# Patient Record
Sex: MALE | Race: WHITE
[De-identification: names, ages, dates, MRNs, and addresses within clinical notes are randomized per-mention and may not be internally consistent; named-entity substitution may affect disease eponyms.]

---

## 2020-04-09 ENCOUNTER — HOSPITAL ENCOUNTER (EMERGENCY)
Age: 60
Discharge: HOME | End: 2020-04-09
Payer: COMMERCIAL

## 2020-04-09 PROCEDURE — 72125 CT NECK SPINE W/O DYE: CPT

## 2020-04-09 PROCEDURE — 70450 CT HEAD/BRAIN W/O DYE: CPT

## 2020-04-09 PROCEDURE — 72128 CT CHEST SPINE W/O DYE: CPT

## 2020-04-09 PROCEDURE — 72131 CT LUMBAR SPINE W/O DYE: CPT

## 2020-04-09 PROCEDURE — 99283 EMERGENCY DEPT VISIT LOW MDM: CPT

## 2023-10-06 LAB
BUN BLD-MCNC: 18 MG/DL (ref 7–18)
GLUCOSE SERPLBLD-MCNC: 134 MG/DL (ref 74–106)
HCT VFR BLD CALC: 34.7 % (ref 39.6–49)
LYMPHOCYTES # SPEC AUTO: 1.1 K/UL (ref 0.7–4.9)
MCV RBC: 87.3 FL (ref 80–100)
PMV BLD: 8.8 FL (ref 7.6–11.3)
POTASSIUM SERPL-SCNC: 2.9 MEQ/L (ref 3.5–5.1)
RBC # BLD: 3.97 M/UL (ref 4.33–5.43)
WBC # BLD AUTO: 6.7 THOU/UL (ref 4.3–10.9)

## 2023-10-06 NOTE — EKG
Test Date:    2023-10-06               Test Time:    11:18:36

Technician:   YUMIKO                                     

                                                     

MEASUREMENT RESULTS:                                       

Intervals:                                           

Rate:         64                                     

DC:           162                                    

QRSD:         94                                     

QT:           448                                    

QTc:          462                                    

Axis:                                                

P:            63                                     

DC:           162                                    

QRS:          -36                                    

T:            25                                     

                                                     

INTERPRETIVE STATEMENTS:                                       

                                                     

Normal sinus rhythm

Left axis deviation

Abnormal ECG

Compared to ECG 09/22/2009 14:32:09

Left-axis deviation now present



Electronically Signed On 10-06-23 12:19:30 CDT by Joseph Mccollum

## 2023-10-06 NOTE — RAD REPORT
EXAM DESCRIPTION:  RAD - Chest Pa And Lat (2 Views) - 10/6/2023 11:34 am

 

CLINICAL HISTORY:  PREPROCEDURE EXAM. Hypertension

 

COMPARISON:  Chest Pa And Lat (2 Views) dated 3/30/2022; CHEST PA AND LAT 2 VIEW dated 9/22/2009

 

TECHNIQUE:  PA and lateral views of the chest were obtained.

 

FINDINGS:  The lungs are clear. Mild hyperinflation, stable. Heart size is normal and central vascula
ture is within normal limits. No pleural effusion or pneumothorax seen. No acute bony finding noted.

 

IMPRESSION:  No acute cardiopulmonary process.

## 2023-10-09 ENCOUNTER — HOSPITAL ENCOUNTER (OUTPATIENT)
Dept: HOSPITAL 97 - OR | Age: 63
Discharge: HOME | End: 2023-10-09
Attending: SURGERY
Payer: COMMERCIAL

## 2023-10-09 VITALS — OXYGEN SATURATION: 93 %

## 2023-10-09 VITALS — TEMPERATURE: 97.6 F | DIASTOLIC BLOOD PRESSURE: 78 MMHG | SYSTOLIC BLOOD PRESSURE: 149 MMHG

## 2023-10-09 DIAGNOSIS — A63.0: Primary | ICD-10-CM

## 2023-10-09 PROCEDURE — 82947 ASSAY GLUCOSE BLOOD QUANT: CPT

## 2023-10-09 PROCEDURE — 36415 COLL VENOUS BLD VENIPUNCTURE: CPT

## 2023-10-09 PROCEDURE — 0HBAXZZ EXCISION OF INGUINAL SKIN, EXTERNAL APPROACH: ICD-10-PCS

## 2023-10-09 PROCEDURE — 93005 ELECTROCARDIOGRAM TRACING: CPT

## 2023-10-09 PROCEDURE — 85025 COMPLETE CBC W/AUTO DIFF WBC: CPT

## 2023-10-09 PROCEDURE — 11426 EXC H-F-NK-SP B9+MARG >4 CM: CPT

## 2023-10-09 PROCEDURE — 88305 TISSUE EXAM BY PATHOLOGIST: CPT

## 2023-10-09 PROCEDURE — 80048 BASIC METABOLIC PNL TOTAL CA: CPT

## 2023-10-09 PROCEDURE — 71046 X-RAY EXAM CHEST 2 VIEWS: CPT

## 2023-10-09 RX ADMIN — SODIUM CHLORIDE ONE GM: 9 INJECTION, SOLUTION INTRAVENOUS at 08:25

## 2023-10-09 RX ADMIN — SODIUM CHLORIDE ONE GM: 9 INJECTION, SOLUTION INTRAVENOUS at 08:08

## 2023-10-09 NOTE — P.BOP
Preoperative diagnosis: Large perineal ulcerated masses [id number]


Postoperative diagnosis: same


Primary procedure: Wide excision large ulcerated skin masses 


Estimated blood loss: <10cc


Specimen: masses


Anesthesia: General


Complications: None


Drain(s): Other (silvadene with gauze)


Transferred to: Recovery Room


Condition: Good

## 2023-10-11 NOTE — DS
Date of Discharge:  10/09/2023



Diagnosis:  Large perineal ulcerated masses.



Procedure:  Wide excision of large ulcerated skin masses.



Disposition:  Home.



Plan:  [facility] and dressing david ESCOBAR

DD:  10/11/2023 10:12:30Voice ID:  [id number]

DT:  10/11/2023 19:31:07Report ID:  [id number]

## 2023-10-11 NOTE — OP
Surgeon:  Silvino Rivero MD



Preoperative Diagnosis:  Large perineal ulcerated mass altogether is about 25 x 20 cm.



Postoperative Diagnosis:  Large perineal ulcerated mass altogether is about 25 x 20 cm.



Procedure:  Wide excision of large ulcerated skin mass about 25 x 20 cm total fungating mass.



Estimated Blood Loss:  Less than 10 mL.



Specimen:  Fungating mass.



Anesthesia:  General plus local.



Findings:  The patient had this fungating lesion, ulcerated, large, multiple areas including perineum
, both thighs, even part of the scrotum.  When you put all this together and satellite lesions ______
____ area is about 25 x 20 cm.



Complications:  None.



Packing:  Gauze with Silvadene.  The area was left to close by secondary intent.



Indication:  This is a case of a 62-year-old patient with a large mass on the perineal area, involved
 part of the scrotal skin and the patient wants that excised.  This is draining, __________ discomfor
t, may be a large gigantic condyloma area.  He understands the risks of excision, which include, but 
not limited to, infection, bleeding, damage to adjacent structures, anesthesia complications, MI, and
 even death.  Proper precaution was done in the OR in case this is a condyloma.  The patient was prep
ped.



Description Of Procedure:  The patient was brought to the operating room, placed in supine position. 
 Anesthesia was done without complication.  The patient was placed in lithotomy position with proper 
protection.  Proper precautions were also done by the OR in case once again if it is a virus.  Wide e
xcision of this area was done.  There are large bases, different locations, multiple satellites.  We 
were trying to remove the bulkiness of this.  There were some small areas he might have to do in anot
her time due to the large amount of the wounds __________ open.  All of them were excised all the way
 down to the subcutaneous tissue.  The area was irrigated.  These are too large to be closed, so we c
overed the area with Silvadene and gauze in that region.  The patient tolerated the procedure well.  
The patient was sent to Recovery in stable condition.





DAYDAY/ALIDA

DD:  10/11/2023 10:12:30Voice ID:  [id number]

DT:  10/11/2023 19:27:43Report ID:  [id number]

## 2023-10-16 ENCOUNTER — HOSPITAL ENCOUNTER (EMERGENCY)
Dept: HOSPITAL 97 - ER | Age: 63
Discharge: HOME | End: 2023-10-16
Payer: COMMERCIAL

## 2023-10-16 VITALS — SYSTOLIC BLOOD PRESSURE: 105 MMHG | DIASTOLIC BLOOD PRESSURE: 74 MMHG

## 2023-10-16 VITALS — OXYGEN SATURATION: 100 % | TEMPERATURE: 98.1 F

## 2023-10-16 DIAGNOSIS — T81.31XA: Primary | ICD-10-CM

## 2023-10-16 DIAGNOSIS — S71.101A: ICD-10-CM

## 2023-10-16 DIAGNOSIS — S71.102A: ICD-10-CM

## 2023-10-16 DIAGNOSIS — F17.210: ICD-10-CM

## 2023-10-16 PROCEDURE — 99283 EMERGENCY DEPT VISIT LOW MDM: CPT

## 2023-10-16 NOTE — EDPHYS
Physician Documentation                                                                           

 [facility]                                                                 

Name: Yuliana Moore                                                                                

Age: 62 yrs                                                                                       

Sex: Male                                                                                         

: 1960                                                                                   

MRN: [id number]                                                                                   

Arrival Date: 10/16/2023                                                                          

Time: 00:06                                                                                       

Account#: [id number]                                                                            

Bed 14                                                                                            

Private MD: Pankaj Lloyd E                                                                     

ED Physician Yonatan Isaac                                                                       

HPI:                                                                                              

10/16                                                                                             

01:54 This 62 yrs old Male presents to ER via Ambulatory with complaints of Skin tag Bleeding.snw 

01:54 Patient presents to ED for recheck of: bleeding surgical wound. The affected area is on snw 

      the posterior perineum. Progress: The patient reports decreased bleeding. The patient       

      has not experienced similar symptoms in the past. Surgical removal of large condyloma       

      by Dr. Rivero on Monday (6 days ago).                                                     

                                                                                                  

Historical:                                                                                       

- Allergies:                                                                                      

00:42 No Known Allergies;                                                                     as6 

- PMHx:                                                                                           

00:42 CAD; COPD; Diabetes - IDDM; High Cholesterol; Hypertension; Arthritis;                  as6 

- PSHx:                                                                                           

00:42 eye; Stented artery; knee; skin tag;                                                    as6 

                                                                                                  

- Immunization history:: Adult Immunizations up to date.                                          

- Social history:: Smoking status: Patient reports the use of cigarette tobacco                   

  products, smokes two packs cigarettes per day.                                                  

                                                                                                  

                                                                                                  

ROS:                                                                                              

01:42 Constitutional: Negative for fever, chills, and weight loss, Eyes: Negative for injury, snw 

      pain, redness, and discharge, ENT: Negative for injury, pain, and discharge, Neck:          

      Negative for injury, pain, and swelling, Cardiovascular: Negative for chest pain,           

      palpitations, and edema, Respiratory: Negative for shortness of breath, cough,              

      wheezing, and pleuritic chest pain, Abdomen/GI: Negative for abdominal pain, nausea,        

      vomiting, diarrhea, and constipation, Back: Negative for injury and pain, MS/Extremity:     

      Negative for injury and deformity, Neuro: Negative for headache, weakness, numbness,        

      tingling, and seizure, Psych: Negative for depression, anxiety, suicide ideation,           

      homicidal ideation, and hallucinations,                                                     

01:42 Skin: Positive for wound bleed, blood clot,                                                 

                                                                                                  

Exam:                                                                                             

01:42 Constitutional:  This is a well developed, well nourished patient who is awake, alert,  snw 

      and in no acute distress. Head/Face:  Normocephalic, atraumatic. Eyes:  Pupils equal        

      round and reactive to light, extra-ocular motions intact.  Lids and lashes normal.          

      Conjunctiva and sclera are non-icteric and not injected.  Cornea within normal limits.      

      Periorbital areas with no swelling, redness, or edema. ENT:  Nares patent. No nasal         

      discharge, no septal abnormalities noted.  Tympanic membranes are normal and external       

      auditory canals are clear.  Oropharynx with no redness, swelling, or masses, exudates,      

      or evidence of obstruction, uvula midline.  Mucous membranes moist. Neck:  Trachea          

      midline, no thyromegaly or masses palpated, and no cervical lymphadenopathy.  Supple,       

      full range of motion without nuchal rigidity, or vertebral point tenderness.  No            

      Meningismus. Chest/axilla:  Normal chest wall appearance and motion.  Nontender with no     

      deformity.  No lesions are appreciated. Cardiovascular:  Regular rate and rhythm with a     

      normal S1 and S2.  No gallops, murmurs, or rubs.  Normal PMI, no JVD.  No pulse             

      deficits. Respiratory:  Lungs have equal breath sounds bilaterally, clear to                

      auscultation and percussion.  No rales, rhonchi or wheezes noted.  No increased work of     

      breathing, no retractions or nasal flaring. Abdomen/GI:  Soft, non-tender, with normal      

      bowel sounds.  No distension or tympany.  No guarding or rebound.  No evidence of           

      tenderness throughout. Back:  No spinal tenderness.  No costovertebral tenderness.          

      Full range of motion. MS/ Extremity:  Pulses equal, no cyanosis.  Neurovascular intact.     

       Full, normal range of motion. Neuro:  Awake and alert, GCS 15, oriented to person,         

      place, time, and situation.  Cranial nerves II-XII grossly intact.  Motor strength 5/5      

      in all extremities.  Sensory grossly intact.  Cerebellar exam normal.  Normal gait.         

      Psych:  Awake, alert, with orientation to person, place and time.  Behavior, mood, and      

      affect are within normal limits.                                                            

01:42 Skin: Appearance: scaly, thickened, dry skin diffusely, area to posterior perineum with     

      post surgical wound that was scooped out diffusely. The right posterior thigh with          

      large hematoma over wound with bleeding around wound. scooped proximal wound that was       

      partially black and spongy is without bleeding. Left thigh with scooped out area with       

      moist rotted cauliflower appearance, no bleeding,                                           

                                                                                                  

Vital Signs:                                                                                      

00:40  / 78; Pulse 68; Resp 18 S; Temp 98.1(TE); Pulse Ox 100% on R/A; Weight 126.1 kg  as6 

      (R); Height 6 ft. 0 in. (R); Pain 3/10;                                                     

01:35  / 74; Pulse 64; Resp 18 S; Pulse Ox 100% on R/A;                                 as6 

00:40 Body Mass Index 37.70 (126.10 kg, 182.88 cm)                                            as6 

00:40 Pain Scale: Adult                                                                       as6 

                                                                                                  

MDM:                                                                                              

00:18 Patient medically screened.                                                             snw 

01:36 Differential diagnosis:.                                                                snw 

01:36 Differential diagnosis: bleeding, infection. Data reviewed: vital signs, nurses notes.  snw 

      Management of patient was discussed with the following: Dr. Rivero, large condyloma       

      removed and wound bed had been bovied to eschar. Pack and send to office in the am.         

      Counseling: I had a detailed discussion with the patient and/or guardian regarding the      

      historical points, exam findings, and any diagnostic results supporting the                 

      discharge/admit diagnosis, the need for outpatient follow up, a general surgeon.            

      Response to treatment: the patient's symptoms have markedly improved after treatment.       

      Special discussion: Based on the history and exam findings, there is no indication for      

      further emergent testing or inpatient evaluation. I discussed with the patient/guardian     

      the need to see the general surgeon for further evaluation of the symptoms. ED course:      

      wound cleansed, blood clots removed, hemostasis maintained, surgicel placed, moist 4x4      

      packed, covered with abd pad and brief placed..                                             

                                                                                                  

10/16                                                                                             

01:23 Order name: Misc. Order: surgicel x3; Complete Time:                               snw 

10/16                                                                                             

01:23 Order name: Dressing - Wound; Complete Time:                                       snw 

10/16                                                                                             

01:23 Order name: Gloves, Sterile; Complete Time:                                        snw 

10/16                                                                                             

01:23 Order name: Setup Suture Tray; Complete Time:                                      snw 

                                                                                                  

Administered Medications:                                                                         

:33 Drug: Hibiclens Topical Liquid 4 % 1 application Topical once Route: Topical; Site:     as6 

      affected area;                                                                              

:33 Follow up: Response: No adverse reaction                                                as6 

                                                                                                  

                                                                                                  

Disposition:                                                                                      

01:44 Co-signature as Attending Physician, Yonatan Isaac MD I agree with the assessment and   kdr 

      plan of care.                                                                               

                                                                                                  

Disposition Summary:                                                                              

10/16/23 01:28                                                                                    

Discharge Ordered                                                                                 

 Notes:       Location: Home                                                                        
  snw

      Condition: Stable                                                                       snw 

      Diagnosis                                                                                   

        - Open wound of thigh                                                                 snw 

        - Disruption of wound, not elsewhere classified - blood clot removal and wound        snw 

      hemostasis                                                                                  

      Followup:                                                                               snw 

        - With: Silvino Rivero MD                                                                

        - When: Tomorrow                                                                           

        - Reason: Recheck today's complaints, Continuance of care, Re-evaluation by your           

      physician                                                                                   

      Discharge Instructions:                                                                     

        - Discharge Summary Sheet                                                             snw 

        - Wound Care, Adult                                                                   snw 

        - Surgical Wound Debridement                                                          snw 

        - Wound Packing                                                                       snw 

      Forms:                                                                                      

        - Medication Reconciliation Form                                                      snw 

        - Thank You Letter                                                                    snw 

        - Antibiotic Education                                                                snw 

        - Prescription Opioid Use                                                             snw 

        - Patient Portal Instructions                                                         snw 

        - Leadership Thank You Letter                                                         snw 

Signatures:                                                                                       

Yonatan Isaac MD MD kdr Waters, Shelly, LISAP-C                   FNP-Csnw                                                  

Niels Mcgee, RN                      RN   as6                                                  

                                                                                                  

**************************************************************************************************

## 2023-10-16 NOTE — ER
Nurse's Notes                                                                                     

 [facility]                                                                 

Name: Yuliana Moore                                                                                

Age: 62 yrs                                                                                       

Sex: Male                                                                                         

: 1960                                                                                   

MRN: [id number]                                                                                   

Arrival Date: 10/16/2023                                                                          

Time: 00:06                                                                                       

Account#: [id number]                                                                            

Bed 14                                                                                            

Private MD: Pankaj Lloyd E                                                                     

Diagnosis: Open wound of thigh;Disruption of wound, not elsewhere classified-blood clot removal   

  and wound hemostasis                                                                            

                                                                                                  

Presentation:                                                                                     

10/16                                                                                             

00:40 Chief complaint: Patient states: "I had some skin tags removed on Monday by Dr. haley Rivero and yesterday since about  they haven't stopped bleeding". Coronavirus         

      screen: At this time, the client does not indicate any symptoms associated with             

      coronavirus-19. Ebola Screen: No symptoms or risks identified at this time. Initial         

      Sepsis Screen: Does the patient meet any 2 criteria? No. Patient's initial sepsis           

      screen is negative. Does the patient have a suspected source of infection? No.              

      Patient's initial sepsis screen is negative. Risk Assessment: Do you want to hurt           

      yourself or someone else? Patient reports no desire to harm self or others. Onset of        

      symptoms was October 15, 2023 at 19:30.                                                     

00:40 Method Of Arrival: Ambulatory                                                           as6 

00:40 Acuity: TATIANA 3                                                                           as6 

                                                                                                  

Historical:                                                                                       

- Allergies:                                                                                      

00:42 No Known Allergies;                                                                     as6 

- PMHx:                                                                                           

00:42 CAD; COPD; Diabetes - IDDM; High Cholesterol; Hypertension; Arthritis;                  as6 

- PSHx:                                                                                           

00:42 eye; Stented artery; knee; skin tag;                                                    as6 

                                                                                                  

- Immunization history:: Adult Immunizations up to date.                                          

- Social history:: Smoking status: Patient reports the use of cigarette tobacco                   

  products, smokes two packs cigarettes per day.                                                  

                                                                                                  

                                                                                                  

Screenin:43 [facility] ED Fall Risk Assessment (Adult) Score/Fall Risk Level 0 - 2 = Low Risk. Abuse  as6 

      screen: Denies threats or abuse. Denies injuries from another. Nutritional screening:       

      No deficits noted. Tuberculosis screening: No symptoms or risk factors identified.          

                                                                                                  

Assessment:                                                                                       

01:34 General: Appears in no apparent distress. Behavior is calm, cooperative. Pain:          as6 

      Complains of pain in buttocks. Neuro: Level of Consciousness is awake, alert, obeys         

      commands, Oriented to person, place, time, situation. Cardiovascular: Capillary refill      

      < 3 seconds Patient's skin is warm and dry. Respiratory: Respiratory effort is even,        

      unlabored, Respiratory pattern is regular, symmetrical. GI: No deficits noted. No signs     

      and/or symptoms were reported involving the gastrointestinal system. : No deficits        

      noted. No signs and/or symptoms were reported regarding the genitourinary system. EENT:     

      No deficits noted. No signs and/or symptoms were reported regarding the EENT system.        

      Derm: Wound noted perineum Wound is concave, bleeding, slough noted. Musculoskeletal:       

      Circulation, motion, and sensation intact.                                                  

                                                                                                  

Vital Signs:                                                                                      

00:40  / 78; Pulse 68; Resp 18 S; Temp 98.1(TE); Pulse Ox 100% on R/A; Weight 126.1 kg  as6 

      (R); Height 6 ft. 0 in. (R); Pain 3/10;                                                     

01:35  / 74; Pulse 64; Resp 18 S; Pulse Ox 100% on R/A;                                 as6 

00:40 Body Mass Index 37.70 (126.10 kg, 182.88 cm)                                            as6 

00:40 Pain Scale: Adult                                                                       as6 

                                                                                                  

ED Course:                                                                                        

00:08 Patient arrived in ED.                                                                  mr  

00:09 Pankaj Lloyd MD is Private Physician.                                                mr  

00:17 [facility]                                                          snw 

00:17 Yonatan Isaac MD is Attending Physician.                                              snw 

00:18 Niels Mcgee, LILIA is Primary Nurse.                                                    as6 

00:26 Arm band placed on.                                                                     as6 

00:42 Triage completed.                                                                       as6 

00:43 Placed in gown. Bed in low position. Call light in reach. Side rails up X 1.            as6 

01:23 Silvino Rivero MD is Referral Physician.                                              snw 

01:33 Provided Education on: follow up, wound care.                                           as6 

01:33 No provider procedures requiring assistance completed. Patient did not have IV access   as6 

      during this emergency room visit.                                                           

                                                                                                  

Administered Medications:                                                                         

01:33 Drug: Hibiclens Topical Liquid 4 % 1 application Topical once Route: Topical; Site:     as6 

      affected area;                                                                              

:33 Follow up: Response: No adverse reaction                                                as6 

                                                                                                  

                                                                                                  

Medication:                                                                                       

01:33 VIS not applicable for this client.                                                     as6 

                                                                                                  

Outcome:                                                                                          

:28 Discharge ordered by MD.                                                                snw 

01:33 Discharged to home ambulatory,                                                          as6 

01:33 Condition: stable                                                                           

01:33 Discharge instructions given to patient, Instructed on discharge instructions, follow       

      up and referral plans. wound care, Demonstrated understanding of instructions,              

      follow-up care, wound care,                                                                 

01:36 Patient left the ED.                                                                    as6 

                                                                                                  

Signatures:                                                                                       

Consuelo Hernandez FNP-C FNP-Nereyda Espinoza, Reg                       Reg  Niels Chavez, RN                      RN   as6                                                  

                                                                                                  

**************************************************************************************************

## 2024-09-11 ENCOUNTER — HOSPITAL ENCOUNTER (EMERGENCY)
Dept: HOSPITAL 97 - ER | Age: 64
Discharge: HOME | End: 2024-09-11
Payer: COMMERCIAL

## 2024-09-11 VITALS — DIASTOLIC BLOOD PRESSURE: 77 MMHG | OXYGEN SATURATION: 95 % | SYSTOLIC BLOOD PRESSURE: 113 MMHG

## 2024-09-11 VITALS — TEMPERATURE: 98 F

## 2024-09-11 DIAGNOSIS — F17.210: ICD-10-CM

## 2024-09-11 DIAGNOSIS — L76.22: Primary | ICD-10-CM

## 2024-09-11 PROCEDURE — 99281 EMR DPT VST MAYX REQ PHY/QHP: CPT

## 2024-09-11 NOTE — XMS REPORT
Continuity of Care Document



                         Created on: 2024





YULIANA MOORE NIMA

External Reference #: [id number]

: 1960

Sex: Male



Demographics





                                        Address              [address] 

[place]  [address]

 

                                        Home Phone          [phone]

 

                                        Work Phone          [phone]

 

                                        Mobile Phone        [phone]

 

                                        Email Address       [email or web address]

 

                                        Preferred Language  English

 

                                        Marital Status      Unknown

 

                                        Mosque Affiliation Unknown

 

                                        Race                Unknown

 

                                        Additional Race(s)  Unavailable

White

 

                                        Ethnic Group        Unknown





Author





                                        Name                Unknown

 

                                        Address             [address]

[place]  [address]

 

                                        [facility]

[employer]connect

 

                                        Address             [address]

[place]  [address]

 

                                        Phone               [phone]





Support





                          Name         Relationship Address      Phone

 

                                ALEAH MOORE     FA               [address]

[place]  [address]                     [phone]

 

                                ALEAH MOORE     Personal Relationship [address]

[place]  [address]                     [phone]

 

                                HANNAH GAGNON    [place]

[place]  [address]                             [phone]

 

                                HANNAH SMITH  SI              [address]

[place]  [address]                     [phone]

 

                                Oscar Jack     Father          [address] [place] [address]                     [phone]





Care Team Providers





                                Care Team Member Name Role            Phone

 

                                Pankaj Lloyd Primary Care Physician [phone]



 

                                Pankaj Lloyd  Attending Clinician Unavailable

 

                                Manuel Tamayo  Attending Clinician Unavailable

 

                                , [facility] Sleep Lab Bed Attending Clinician Unavail

able

 

                                Sly Oconnor MD Attending Clinician [phone]

[phone]

 

                                SLY OCONNOR Attending Clinician UnavailSLY Orlando Attending Clinician Unavaila

azul

 

                                Doctor Unassigned, No Name Attending Clinician U

navailable

 

                                Vasyl Lloyd  Attending Clinician Unavailable

 

                                Edmund Jeffrey Attending Clinician Unavailabl

e

 

                                MANUEL TAMAYO  Attending Clinician Unavailable

 

                                MARK YEE  Attending Clinician Unavailable

 

                                Will RODRIGUES, Zachary HEART Attending Clinician Unavail

able

 

                                ADDY PEREZ       Attending Clinician Unavailable

 

                                Solange Chavez Attending Clinician [phone]



 

                                Addy Perez MD    Attending Clinician [phone]

[phone]

 

                                Radiology       Attending Clinician Unavailable

 

                                Manuel Tamayo  Admitting Clinician Unavailable

 

                                Edmund Jeffrey Admitting Clinician UnavailMANUEL Alvarado  Admitting Clinician Unavailable

 

                                Anuradha Dash Admitting Clinician Unavailable

 

                                MARK EYE  Admitting Clinician Unavailable

 

                                ADDY PEREZ       Admitting Clinician Unavailable

 

                                Addy Perez MD    Admitting Clinician [phone]

[phone]







Payers





                    Payer Name Policy Type Policy Number Effective Date Expirati

on Date Source

 

                                                    HIM MITESH FROM 

[facility]                         [id number]         2020 

00:00:00                                            







Problems





                                                    Condition 

Name                                    Condition 

Details                                 Condition 

Category                  Status                    Onset 

Date                                    Resolution 

Date                                    Last 

Treatment 

Date                                    Treating 

Clinician                 Comments                  Source

 

                                                    Coronary 

artery 

disease 

involving 

native 

coronary 

artery of 

native 

heart with 

angina 

pectoris                                Coronary 

artery 

disease 

involving 

native 

coronary 

artery of 

native 

heart with 

angina 

pectoris            Disease             Active               

00:00:

00                                                               [facility]

 

                                                    Acute on 

chronic 

diastolic 

congestive 

heart 

failure                                 Acute on 

chronic 

diastolic 

congestive 

heart 

failure             Disease             Active               

00:00:

00                                                               [facility]

 

                                                    Essential 

hypertensi

on                                      Essential 

hypertensi

on                  Disease             Active               

00:00:

00                                                               [facility]

 

                                                    Other 

hyperlipid

emia                                    Other 

hyperlipid

emia                Disease             Active               

00:00:

00                                                               [facility]

 

                                                    Type 2 

diabetes 

mellitus 

without 

complicati

on, 

without 

long-term 

current 

use of 

insulin                                 Type 2 

diabetes 

mellitus 

without 

complicati

on, 

without 

long-term 

current 

use of 

insulin             Disease             Active               

00:00:

00                                                               [facility]

 

                                                    BROOKE 

(obstructi

ve sleep 

apnea)                                  BROOKE 

(obstructi

ve sleep 

apnea)              Disease             Active              

6 

00:00:

00                                                               [facility]

 

                                                    Acute 

kidney 

injury 

(LAURA) with 

acute 

tubular 

necrosis 

(ATN)                                   Acute 

kidney 

injury 

(LAURA) with 

acute 

tubular 

necrosis 

(ATN)               Disease             Active              

3- 

00:00:

00                                                               [facility]

 

                                                    Hyperglyce

cortney                                     Hyperglyce

cortney                 Disease             Active              

2-29 

00:00:

00                                                               [facility]

 

                      Chest pain Chest pain Disease    Active     

9-10 

00:00:

00                                                               [facility]

 

                                                    Other 

chest pain                              Other 

chest pain          Disease             Active              2017

0-08 

00:00:

00                                                               [facility]

 

                                                    Unstable 

angina                                  Unstable 

angina              Disease             Active              2017

0-07 

00:00:

00                                                               [facility]

 

                                                    Obesity 

(BMI 

30-39.9)                                Obesity 

(BMI 

30-39.9)            Disease             Active              2017

0 

00:00:

00                                                               [facility]

 

                                                    Knee pain, 

left                                    Knee pain, 

left                Disease             Active              2017-0

5-10 

00:00:

00                                                               [facility]

 

                                                    Knee pain, 

left                                    Knee pain, 

left                Disease             Active              2017-0

5-10 

00:00:

00                                                               [facility]







Allergies, Adverse Reactions, Alerts





                                                    Allergy 

Name                                    Allergy 

Type            Status          Severity        Reaction(s)     Onset 

Date                                    Inactive 

Date                                    Treating 

Clinician                 Comments                  Source

 

                                                    No Known 

Allergie

s            DA           Active       U                          

00:00:

00                                                              [facility]

 

                                                    No Known 

Allergie

s            DA           Active       U                         2017

0 

00:00:

00                                                              [facility]

 

                                                    NO KNOWN 

ALLERGIE

S                                       Drug 

Class   Active                                                  [facility]







Social History





                    Social Habit Start Date Stop Date Quantity  Comments  Source

 

                                                    History of tobacco 

use                                    Cigarette Smoker              [facility]

 

                                                    Exposure to 

SARS-CoV-2 (event)                      2023 

00:00:00                                2023 

02:23:00            Not sure                                [facility]

 

                                        Alcohol intake      2020 

00:00:00                                2020 

00:00:00                                Current 

non-drinker of 

alcohol 

(finding)                                           [facility]

 

                                                    Tobacco use and 

exposure                                2017-10-07 

00:00:00                                2017-10-07 

00:00:00                                Smokeless 

tobacco non-user                                    [facility]

 

                                                    Cigarettes smoked 

current (pack per 

day) - Reported                         2017-10-07 

00:00:00                                2017-10-07 

00:00:00                                                    [facility]

 

                                                    Cigarette 

pack-years                              2017-10-07 

00:00:00                                2017-10-07 

00:00:00                                                    [facility]

 

                                        Alcohol Comment     2009-12-15 

00:00:00                                2009-12-15 

00:00:00            quit drinking                           [facility]

 

                                                    Sex Assigned At 

Birth                                   1960 

00:00:00                                1960 

00:00:00                                                    [facility]







                          Smoking Status Start Date   Stop Date    Source

 

                          Smokes tobacco daily 2017-10-07 00:00:00              

[facility]







Medications





                                                    Ordered 

Medication 

Name                                    Filled 

Medication 

Name                                    Start 

Date                                    Stop 

Date                                    Current 

Medication?                             Ordering 

Clinician       Indication      Dosage          Frequency       Signature 

(SIG)               Comments            Components          Source

 

                                                    aspirin 81 

mg chewable 

tablet                                               

00:00:

00                  Yes                 [id number]  81mg                Take 1 

tablet by 

mouth 

daily.                                                      [facility]

 

                                                    tamsulosin 

0.4 mg 24 

hr capsule                                           

14:28:

29                  Yes                           .4mg                Take 0.4 

mg by 

mouth 

daily.                                                      [facility]

 

                                                    metFORMIN 

500 mg 

tablet                                               

14:28:

29                  Yes                           500mg               Take 500 

mg by 

mouth 2 

(two) 

times 

daily with 

meals.                                                      [facility]

 

                                                    insulin 

aspart 

prot/insuln 

asp 

(NOVOLOG 

MIX 

70-30FLEXPE

N U-100 SC)                                          

14:28:

29                  Yes                                               inject 

under the 

skin 2 

(two) 

times 

daily with 

meals.                                                      [facility]

 

                                                    benazepril-

hydrochlort

hiazide 

20-25 mg 

per tablet                                           

00:00:

00                  Yes                 [id number]  1{tbl}              Take 1 

tablet by 

mouth 

daily.                                                      [facility]

 

                                                    carvediloL 

12.5 mg 

tablet                                               

00:00:

00                  Yes                 [id number]  12.5mg              Take 1 

tablet by 

mouth 2 

(two) 

times 

daily.                                                      [facility]

 

                                                    isosorbide 

mononitrate 

60 mg 24 hr 

tablet                                               

00:00:

00                  Yes                 [id number]  60mg                Take 1 

tablet by 

mouth 

daily.                                                      [facility]

 

                                                    tamsulosin 

0.4 mg 24 

hr capsule                                          2020-0

3-02 

18:15:

44                  Yes                           .4mg                Take 0.4 

mg by 

mouth 

daily.                                                      [facility]

 

                                                    metFORMIN 

(GLUCOPHAGE

) tablet 

500 mg                                              2020-0

3-02 

14:00:

00                  Yes                           500mg               500 mg, 

Oral, BID 

MEALS, 

First dose 

on Mon 

3/2/20 at 

0800, 

Until 

Discontinu

ed, 

Routine                                                     [facility]

 

                                                    glyBURIDE 5 

mg tablet                                           2020-0

3-02 

00:00:

00                                       

04:59

:00        No                    [id number]   5mg                   Take 1 

tablet by 

mouth 2 

(two) 

times 

daily with 

meals for 

30 days.                                                    [facility]

 

                                                    metFORMIN 

500 mg 

tablet                                              2020-0

3-02 

00:00:

 

04:59

:00        No                    [id number]   500mg                 Take 1 

tablet by 

mouth 2 

(two) 

times 

daily with 

meals for 

30 days.                                                    [facility]

 

                                                    NaCl 0.9% 

(NS) IV 

infusion 

1,000 mL                                            2020-0

3-01 

23:45:

00                  Yes                           1000mL              at 75 

mL/hr, IV 

Infusion, 

CONTINUOUS

, Starting 

Sun 3/1/20 

at 1745, 

Until 

Discontinu

ed, 

Routine                                                     [facility]

 

                                                    clopidogreL 

(PLAVIX) 

tablet 75 

mg                                                  2020-0

3-01 

15:00:

00                  Yes                           75mg                75 mg, 

Oral, 

DAILY, 

First dose 

on Sun 

3/1/20 at 

0900, 

Until 

Discontinu

ed, 

Routine                                                     [facility]

 

                                                    glyBURIDE 

(DIABETA) 

tablet 5 mg                                         2020-0

3-01 

14:30:

00                  Yes                           5mg                 5 mg, 

Oral, BID 

MEALS, 

First dose 

on Sun 

3/1/20 at 

0830, 

Until 

Discontinu

ed, 

Routine                                                     [facility]

 

                                                    gabapentin 

(NEURONTIN) 

tablet 600 

mg                                                  2020-0

3-01 

14:00:

00                  Yes                           600mg               600 mg, 

Oral, TID, 

First dose 

on Sun 

3/1/20 at 

0800, 

Until 

Discontinu

ed, 

Routine                                                     [facility]

 

                                                    carvediloL 

(COREG) 

tablet 6.25 

mg                                                  2020-0

3-01 

14:00:

00                  Yes                           6.25mg              6.25 mg, 

Oral, BID, 

First dose 

on Sun 

3/1/20 at 

0800, 

Until 

Discontinu

ed, 

Routine                                                     [facility]

 

                                                    cetirizine 

(ZYRTEC) 

tablet 5 mg                                         2020-0

3-01 

06:15:

00                                       

05:20

:00        No                               5mg                   5 mg, 

Oral, 

ONCE, 1 

dose, Sun 

3/1/20 at 

0015, 

Routine                                                     [facility]

 

                                                    fluticasone 

propion-sal

meterol 

(ADVAIR) 

250-50 

mcg/dose 

inhalation 

disk 1 Puff                                         2020-0

3-01 

05:15:

00                  Yes                           1{puff}             1 Puff, 

Inhalation

, Q12H, 

First dose 

on Sat 

20 at 

2315, 

Until 

Discontinu

ed, 

Routine<br

>Use 

approved 

by 

(Faculty 

and 

pager): 

[facility] 

PROVIDER                                                    [facility]

 

                                                    tamsulosin 

(FLOMAX) 

capsule 0.4 

mg                                                  2020-0

3-01 

05:15:

00                  Yes                           .4mg                0.4 mg, 

Oral, 

DAILY, 

First dose 

on Sat 

20 at 

2315, 

Until 

Discontinu

ed, 

Routine                                                     [facility]

 

                                                    pravastatin 

(PRAVACHOL) 

tablet 80 

mg                                                  2020-0

3-01 

05:15:

00                  Yes                           80mg                80 mg, 

Oral, QHS, 

First dose 

on Sat 

20 at 

2315, 

Until 

Discontinu

ed, 

Routine                                                     [facility]

 

                                                    nitroglycer

in 

(NITROSTAT) 

sublingual 

tablet 0.4 

mg                                                  2020-0

3-01 

04:59:

35                  Yes                           .4mg                0.4 mg, 

Sublingual

, Q5MIN 

PRN, 

Starting 

Sat 

20 at 

2259, 

Until 

Discontinu

ed, 

Routine, 

Chest pain                                                  [facility]

 

                                                    ipratropium

-albuterol 

(DUONEB) 

0.5 mg-3 

mg(2.5 mg 

base)/3 mL 

nebulizer 

solution 3 

mL                                                  2020-0

3-01 

04:52:

22                  Yes                           3mL                 3 mL, 

Inhalation

, QIDPRN, 

Starting 

Sat 

20 at 

2252, 

Until 

Discontinu

ed, 

Routine, 

Wheezing, 

Shortness 

of Breath                                                   [facility]

 

                                                    nicotine 

(NICODERM) 

21 mg/24 hr 

patch 1 

Patch                                               2020-0

3-01 

03:45:

00                        Yes                                    1{patch

}                                                   1 Patch, 

Topical, 

Administer 

over 24 

Hours, 

Q24H, 

First dose 

on Sat 

20 at 

2145, 

Until 

Discontinu

ed, 

Routine                                                     [facility]

[facility]

 

                                                    insulin 

regular 

human 

(HUMULIN R) 

injection 

10 Units                                            2020-0

3-01 

00:00:

00                                       

23:37

:00        No                               10U                   10 Units, 

Subcutaneo

us, ONCE, 

1 dose, 

Sat 

20 at 

1800, 

Routine                                                     [facility]

 

                                                    Sliding 

Scale 

Insulin - 

Aspart 

(NOVOLOG) + 

Fsbg 

Testing                                              

23:00:

00                  Yes                                               Subcutaneo

us, TID 

MEALS+HS, 

First dose 

on Sat 

20 at 

1700, 

Until 

Discontinu

ed, 

Routine                                                     [facility]

 

                                                    NaCl 0.9% 

(NS) IV 

infusion 

1,000 mL                                             

22:45:

00                                       

23:33

:30        No                               1000mL                at 125 

mL/hr, IV 

Infusion, 

CONTINUOUS

, Starting 

Sat 

20 at 

1645, 

Until Sun 

3/1/20 at 

1733, 

Routine                                                     [facility]

 

                                                    ondansetron 

(ZOFRAN 

(PF)) 

injection 4 

mg                                                   

22:42:

59                  Yes                           4mg                 4 mg, Slow

 

IV Push, 

Q6HPRN, 

Starting 

Sat 

20 at 

1642, 

Until 

Discontinu

ed, 

Routine, 

Nausea and 

Vomiting 

(N/V)                                                       [facility]

 

                                                    acetaminoph

en 

(TYLENOL) 

tablet 650 

mg                                                   

22:42:

54                  Yes                           650mg               650 mg, 

Oral, 

Q6HPRN, 

Starting 

Sat 

20 at 

1642, 

Until 

Discontinu

ed, 

Routine, 

Pain 

(scale 

1-3)                                                        [facility]

 

                                                    insulin 

regular 

human 

(HUMULIN R) 

injection 

10 Units                                             

21:00:

00                                       

20:13

:00        No                               10U                   10 Units, 

Slow IV 

Push, 

ONCE, 1 

dose, Sat 

20 at 

1500, 

Routine                                                     [facility]

 

                                                    NaCl 0.9% 

(NS) bolus 

infusion 

1,000 mL                                             

20:15:

00                                       

19:12

:00        No                               1000mL                at 999 

mL/hr, 

1,000 mL, 

IV 

Infusion, 

ONCE, 1 

dose, Sat 

20 at 

1415, STAT                                                  [facility]

 

                                                    NaCl 0.9% 

(NS) bolus 

infusion 

1,000 mL                                             

20:00:

00                                       

19:38

:00        No                               1000mL                at 999 

mL/hr, 

1,000 mL, 

IV 

Infusion, 

ONCE, 1 

dose, Sat 

20 at 

1400, STAT                                                  [facility]

 

                                                    tamsulosin 

0.4 mg 24 

hr capsule                                          12 

00:36:

00                  Yes                           .4mg                Take 0.4 

mg by 

mouth 

daily.                                                      [facility]

 

                                                    fluticasone

-salmeterol 

250-50 

mcg/dose 

inhalation 

disk                                                2017 

00:00:

00                  Yes                           1{puff}             Inhale 1 

Puff every 

12 

(twelve) 

hours.                                                      [facility]

 

                                                    nitroglycer

in 0.4 mg 

sublingual 

tablet                                              2017 

00:00:

00                  Yes                           .4mg                Place 1 

tablet 

under the 

tongue 

every 5 

(five) 

minutes as 

needed for 

Chest 

pain.                                                       [facility]

 

                                                    fluticasone

-salmeterol 

250-50 

mcg/dose 

inhalation 

disk                                                2017 

00:00:

00                  Yes                           1{puff}             Inhale 1 

Puff every 

12 

(twelve) 

hours.                                                      [facility]

 

                                                    albuterol 

(VENTOLIN 

HFA) 90 

mcg/actuati

on inhaler                                          2017 

00:00:

00                  Yes                           2{puff}             Inhale 2 

Puffs 

every 6 

(six) 

hours as 

needed for 

Wheezing 

or 

Shortness 

of Breath.                                                  [facility]

 

                                                    diclofenac 

75 mg EC 

tablet                                               

00:00:

00                  Yes                           75mg                Take 1 

tablet by 

mouth 2 

(two) 

times 

daily with 

meals.                                                      [facility]

 

                                                    clopidogrel 

75 mg 

tablet                                               

00:00:

00                  Yes                           75mg                Take 75 mg

 

by mouth 

daily.                                                      [facility]

 

                                                    glyBURIDE 5 

mg tablet                                            

00:00:

00                                       

00:00

:00        No                               5mg                   5 mg daily 

with 

breakfast.                                                  [facility]

 

                                                    gabapentin 

600 mg 

tablet                                               

00:00:

00                  Yes                           600mg               Take 600 

mg by 

mouth 2 

(two) 

times 

daily.                                                      [facility]

 

                                                    PRAVASTATIN 

40 MG ORAL 

TAB                                                  

00:00:

00                  Yes                                               take 2 

tabs qd                                                     [facility]

 

                                                    COREG 6.25 

MG ORAL TAB                                          

00:00:

00                  Yes                                               1 tab PO 

BID                                                         [facility]

 

                                                    BENAZEPRIL 

20 MG ORAL 

TAB                                                  

00:00:

00                  Yes                                               1 tab PO 

daily                                                       [facility]

 

                                                    METFORMIN 

500 MG ORAL 

TAB                                                  

00:00:

00                                       

00:00

:00        No                                                     1 tab PO 

BID                                                         [facility]







Vital Signs





                      Vital Name Observation Time Observation Value Comments   S

zainab

 

                                                    Systolic blood 

pressure        2020 17:00:00 108 mm[Hg]                      [facility]

 

                                                    Diastolic blood 

pressure        2020 17:00:00 80 mm[Hg]                       [facility]

 

                      Heart rate 2020 17:00:00 67 /min               [facility]

[facility]

 

                      Body temperature 2020 17:00:00 36.17 Em            

 [facility]

 

                      Respiratory rate 2020 17:00:00 16 /min              

 [facility]

 

                                                    Oxygen saturation in 

Arterial blood by 

Pulse oximetry  2020 17:00:00 99 /min                         [facility]

 

                      Body weight 2020 20:45:00 127.461 kg            [facility]

 

                      BMI        2020 20:45:00 38.11 kg/m2            [facility]

 

                                                    Systolic blood 

pressure        2020 17:00:00 108 mm[Hg]                      [facility]

 

                                                    Diastolic blood 

pressure        2020 17:00:00 80 mm[Hg]                       [facility]

 

                      Heart rate 2020 17:00:00 67 /min               [facility]

[facility]

 

                      Body temperature 2020 17:00:00 36.17 Em            

 [facility]

 

                      Respiratory rate 2020 17:00:00 16 /min              

 [facility]

 

                                                    Oxygen saturation in 

Arterial blood by 

Pulse oximetry  2020 17:00:00 99 /min                         [facility]

 

                      Body weight 2020 20:45:00 127.461 kg            [facility]

 

                      BMI        2020 20:45:00 38.11 kg/m2            [facility]







Procedures





                                        Procedure           Date / Time 

Performed                 Performing Clinician      Source

 

                                SLEEP STUDY DATA REPORT 2023 05:01:00 Doct

or Unassigned, No 

Name                                    [facility]

 

                                                    EXTERNAL PROVIDER 

RECORDS                   2023 06:01:00       Doctor Unassigned, No 

Name                                    [facility]

 

                                                    POCT GLUCOSE 

(AUTOMATED)         2020 13:37:00 Addy Perez           [facility]

 

                                                    BASIC METABOLIC PANEL 

(NA, K, CL, CO2, 

GLUCOSE, BUN, 

CREATININE, CA)     2020 09:02:00 Mark Yee      [facility]

 

                                                    POCT GLUCOSE 

(AUTOMATED)         2020 01:18:00 Addy Perez           [facility]

 

                                                    POCT GLUCOSE 

(AUTOMATED)         2020 21:23:00 Addy Perez           [facility]

 

                                                    POCT GLUCOSE 

(AUTOMATED)         2020 17:02:00 Chris [facility]

 

                                                    POCT GLUCOSE 

(AUTOMATED)         2020 14:47:00 Chris [facility]

 

                                                    POCT GLUCOSE 

(AUTOMATED)         2020 11:08:00 Chris [facility]

 

                          MAGNESIUM    2020 10:29:00 Addy Perez    [facility]

 

                          TROPONIN I   [facility]29:[facility]

 

                                                    BASIC METABOLIC PANEL 

(NA, K, CL, CO2, 

GLUCOSE, BUN, 

CREATININE, CA)     2020 10:29:00 Yoly [facility]

 

                          CBC WITH DIFFERENTIAL 2020 10:29:00 Chris [facility]

 

                                                    POCT GLUCOSE 

(AUTOMATED)         2020 06:56:00 Chris [facility]

 

                          [facility]ST [facility]

 

                          [facility]   [facility]

 

                                                    BASIC METABOLIC PANEL 

(NA, K, CL, CO2, 

GLUCOSE, BUN, 

CREATININE, CA)     2020 03:26:00 Chris [facility]

 

                                                    POCT GLUCOSE 

(AUTOMATED)         2020 03:09:00 Chris [facility]

 

                                                    BASIC METABOLIC PANEL 

(NA, K, CL, CO2, 

GLUCOSE, BUN, 

CREATININE, CA)     2020 00:35:00 Chris [facility]

 

                                                    POCT GLUCOSE 

(AUTOMATED)         2020 23:36:00 Chris [facility]

 

                                                    POCT GLUCOSE 

(AUTOMATED)         2020 20:50:00 Chris [facility]

 

                                                    POCT GLUCOSE 

(AUTOMATED)         2020 20:10:00 Solange Morales     [facility]

 

                                                    NOTICE OF PRIVACY 

PRACTICES                 2020 19:51:59       Doctor Unassigned, No 

Name                                    [facility]

 

                          EKG-12 LEAD  2020 19:27:52 [facility]

[facility]

 

                          TROPONIN I   2020 19:11:00 Solange Morales aMrie

[facility]

 

                                                    COMP. METABOLIC PANEL 

([id number])             2020 19:11:00 Solange Morales     [facility]

 

                                                    ACUTE CARE VENOUS BLOOD 

GAS                 2020 19:11:00 Solange Morales     [facility]

 

                          CBC WITH DIFFERENTIAL 2020 19:11:00 Nereyda Morales [facility]

 

                                                    GLYCOSYLATED HEMOGLOBIN 

(A1C)               2020 19:11:00 Addy Perez           [facility]

 

                          URINALYSIS   2020 19:11:00 [facility]

[facility]

 

                                                    POCT GLUCOSE(AGE 

>30DAYS)            2020 19:11:00 Solange Morales     [facility]

 

                          EKG-12 LEAD  2020 18:51:31 [facility]

[facility]

 

                                                    POCT GLUCOSE 

(AUTOMATED)         2020 18:44:00 Andrew Solange     [facility]

 

                                                    CONSENT/REFUSAL FOR 

DIAGNOSIS AND TREATMENT   2020 18:38:35       Doctor Unassigned, No 

Name                                    [facility]

 

                          XR SPINE THORACIC 2 VW 2019 17:26:28 Lloyd Will

radha E [facility]

 

                                ASSIGNMENT OF BENEFITS 2019 16:54:00 Docto

r Unassigned, No 

Name                                    [facility]







Encounters





                                                    Start 

Date/Time                               End 

Date/Time                               Encounter 

Type                                    Admission 

Type                                    Attending 

[facility] 

Facility                                Care 

Department                              Encounter 

ID                                      Source

 

                                                    2024 

10:17:01                        Outpatient                      Pankaj Lloyd             NORRIS                 [facility]                 [id number]

[id number]                                   [place] 

Special

ties

 

                                                    2021-12-15 

10:00:00                        Inpatient       TRINIDAD              [place] 

Manuel              [facility]                [id number]

20                                      [facility]

 

                                                    2023 

20:00:00                                2023 

22:30:00                                Technician 

Visit                                               1, [facility] 

Sleep Lab 

Bed

Sly Oconnor                               [facility]                                  [id number]

[id number]

[id number]

[phone]

193                       [id number]                 [facility]

 

                                                    2023 

20:00:00                                2023 

20:00:00            Outpatient          SLY GUTIERREZ STRAHIL         [facility]            [id number]      [facility]

 

                                                    2023 

00:00:00                                2023 

00:00:00                                Orders 

Only                                                Doctor 

Unassigned, 

No Name                                 [facility]                                [id number]

[id number]

[id number]

[phone]

009                       [id number]                 [facility]

 

                                                    2023 

00:00:00                                2023 

00:00:00                                Orders 

Only                                                Doctor 

Unassigned, 

No Name                                 [facility]                                [id number]

[id number]

[id number]

[phone]

009                       [id number]                 [facility]

 

                                                    2023 

12:56:00                                2023 

12:56:00            Outpatient                              Manuel Tamayo              [facility]               [id number]

24                                      [facility]

 

                                                    2022-04-15 

12:36:00                                2022-04-15 

12:36:00            Outpatient          Vasyl Wallace               [facility]               [facility]                [id number]

19                                      [facility]

 

                                                    2021 

08:40:00                                2021-11-10 

09:13:00            Inpatient           Laurent Bradenory             [facility]               [facility]                [id number]

72                                      [facility]

 

                                                    2021-10-05 

10:30:00                                2021-10-05 

09:19:00            Inpatient           MNAUEL PINO              [facility]               [facility]                [id number]

17                                      [facility]

 

                                                    2021 

09:00:00                                2021 

09:00:00            Outpatient                              RacheleEdmund             [facility]               [facility]                [id number]

15                                      [facility]

 

                                                    2021 

09:00:00                                2021 

08:22:00            Inpatient           TRINIDAD                  RacheleEdmund             [facility]               [facility]                [id number]

76                                      [facility]

 

                                                    2020 

18:10:26                                2020 

14:25:00            Outpatient          MARK TRONCOSO           [facility]             [id number]      [facility]

 

                                                    2020 

00:00:00                                2020 

00:00:00                                Transition 

of Care                                             Zachary Solis                                   [id number]

[id number]

[id number]

[phone]

403                       [id number]                  [facility]

 

                                                    2020 

00:00:00                                2020 

00:00:00                                Transition 

of Care                                             Zachary Solis                                   [id number]

[id number]

[id number]

[phone]

403                       [id number]                  

 

                                                    2020 

12:49:29                                2020 

11:48:00  Inpatient X         CHRIS [facility]       [id number] [facility]

 

                                                    2020 

12:49:2020 

11:48:00                                Hospital 

Encounter                                           Solange Morales

Chris [facility]                                  [id number]

[id number]

[id number]

[phone]

081                       [id number]                  [facility]

 

                                                    2020 

12:49:29                                2020 

11:48:00                                Hospital 

Encounter                                           Solange Morales

Chris [facility]                                  [id number]

[id number]

[id number]

[phone]

081                       [id number]                  

 

                                                    2020 

00:00:00                                2020 

00:00:00                                Orders 

Only                                                Doctor 

Unassigned, 

No Name                                 [facility]                                [id number]

[id number]

[id number]

[id number]

009                       [id number]                  [facility]

 

                                                    2020 

00:00:00                                2020 

00:00:00                                Orders 

Only                                                Doctor 

Unassigned, 

No Name                                 [facility]                                [id number]

[id number]

[id number]

[phone]

009                       [id number]                  

 

                                                    2019 

11:55:23                                2019 

23:59:00                                Hospital 

Encounter                               [facility]                                  [id number]

[id number]

[id number]

[phone]

807                       [id number]                  [facility]

 

                                                    2019 

11:55:23                                2019 

23:59:00                                Hospital 

Encounter                               Radiology           [facility]                                  [id number]

[id number]

[id number]

[phone]

807                       [id number]                  

 

                                                    2019 

00:00:00                                2019 

00:00:00                                Orders 

Only                                                Doctor 

Unassigned, 

No Name                                 [facility]                                [id number]

[id number]

[id number]

[phone]

009                       [id number]                  [facility]

 

                                                    2019 

00:00:00                                2019 

00:00:00                                Orders 

Only                                                Doctor 

Unassigned, 

No Name                                 [facility]                                [id number]

[id number]

[id number]

[phone]

009                       [id number]                  







Results





                      Test Description Test Time  Test Comments Results    Resul

t 

Comments                                Source

 

                                                    - CT LD LUNG CA 

SCREENING                               2022-04-15 

14:50:00                                            *********************

*********************

******************[facility] [place] 

WESTName: YULIANA MOORE : 

1960 Sex: 

M********************

*********************

******************* 

Patient Name: 

YULIANA MOORE 

Unit No: [id number] 

EXAMS: CPT CODE: 

[id number] CT LD LUNG 

CA SCREENING 22711 

EXAM: Screening CT 

chest without 

contrast (low dose) 

LOCATION: B2 

INDICATION: Lung 

cancer screening  

TECHNIQUE: Axial 2.5 

mm images of the 

chest were obtained 

using low-dose CT 

technique, with 

sagittal and coronal 

reformats. This exam 

was performed 

according to our 

departmental 

dose-optimization 

program, which 

includes automated 

exposure control, 

adjustment of the mA 

and/or kV according 

to patient size, 

and/or use of 

iterative 

reconstruction 

technique.  

COMPARISON: Low-dose 

CT chest dated 

10/5/2021 DISCUSSION: 

RISK FACTORS: Age: 61 

years Smokin 

pack years NODULES: 5 

mm and 4 mm right 

upper lobe nodules 

(axial image 27), 8 

mm left upper lobe 

nodule (axial image 

35), and 4 mm left 

lower lobe nodule 

(axial image 75) are 

unchanged. No new 

nodule is identified. 

Additional findings: 

Lungs: 8 mm left 

major fissural lymph 

node is unchanged. 

Small calcified left 

upper lobe granuloma 

is also unchanged. 

Mediastinum: The 

heart is normal in 

size. Coronary artery 

calcifications are 

present. Lymph nodes: 

There is no 

mediastinal, hilar, 

or axillary 

lymphadenopathy. 

Osseous 

structures/soft 

tissues: Mild 

degenerative changes 

are seen in the 

spine. Upper abdomen: 

1.3 cm lipid rich 

left adrenal gland 

nodule is present; no 

follow-up is needed.  

IMPRESSION: Lung-Rads 

Category 2. 

RECOMMENDATION: 

Recommend continued 

annual low-dose 

screening CT chest in 

12 months. 

*******************FO

R INTERNAL CODING 

PURPOSES 

ONLY**************** 

RESULT CODE: L2 

FOLLOW UP: L12 ** 

Electronically Signed 

by Taryn Barrientos MD on 

04/15/2022 at 1450 ** 

Reported and signed 

by: Taryn Barrientos MD 

[facility] NAME: 

YULIANA MOORE 

[address] [place] [address] PHYS: Manuel Dave  

[place] [address] 

: 1960 AGE: 

61 SEX: M ACCT NO: 

[id number] LOC: 

Z.ZCTS PHONE #: 

[phone] EXAM 

DATE: 04/15/2022 

STATUS: REG CLI FAX 

#: [phone] RAD 

#: D/C DT PAGE 1 

Signed Report 

(CONTINUED) Patient 

Name: YULIANA MOORE Unit No: 

[id number] EXAMS: CPT 

CODE: [id number] CT LD 

LUNG CA SCREENING 

[address]  (Continued) 

CC: Manuel ACUÑA  

Technologist: Eve Castillo, RT(R) CTDI: 

DLP: Trnscrpt: 

04/15/2022 (1450) 

t.SDR.PR7  [place] 

[facility] 

NAME: YULIANA MOORE [address] [facility] 

[facility] 

[place] [address] 

: 1960 AGE: 

61 SEX: M ACCT NO: 

[id number] LOC: 

TARIK PHONE #: 

[phone] EXAM 

DATE: 04/15/2022 

STATUS: REG CLI FAX 

#: [phone] RAD 

#: D/C DT PAGE 2 

Signed Report Patient 

Name: YULIANA MOORE Unit No: 

[id number] EXAMS: CPT 

CODE: [id number] CT LD 

LUNG CA SCREENING 

[address] (Continued) 

Orig Print D/T: S: 

04/15/2022 () 

[facility] [place] [facility] NAME: 

YULIANA MOORE  

[address] [facility] [address] PHYS: [place] [address] 

: 1960 AGE: 

61 SEX: M ACCT NO: 

[id number] LOC: 

TARIK PHONE #: 

[phone] EXAM 

DATE: 04/15/2022 

STATUS: REG CLI FAX 

#: [phone] RAD 

#: D/C DT PAGE  3 

Signed Report                                       







                                        

 

                                        

 

                                        

 

                                        

 

                                        

 

                                        

 

                                        

 

                                        

 

                                        

 

                                        

 

                                        

 

                                        





CBC W/AUTO [id number] 05:41:00* 



                      Test Item  Value      Reference Range Interpretation Comme

nts

 

                                                    WHITE BLOOD CELL (test code 

= 

WBC)            6.2 K/MM3       3.8-9.8         N               

 

                                                    RED BLOOD CELL (test code = 

RBC)            4.06 M/MM3      3.95-5.67       N               

 

                      HEMOGLOBIN (test code = HGB) 11.6 G/DL  12.4-16.7  L      

    

 

                      HEMATOCRIT (test code = HCT) 36.8 %     35.9-49.5  N      

    

 

                                                    MEAN CELL VOLUME (test code 

= 

MCV)            91 fL           81.7-96.1       N               

 

                      MEAN CELL HGB (test code = MCH) 28.6 pg    27.6-33.2  N   

       

 

                                                    MEAN CELL HGB CONCETRATION 

(test code = MCHC) 31.5 %          32.9-35.5       L               

 

                                                    RED CELL DISTRIBUTION WIDTH 

(test code = RDW) 14.2 %          12.1-15.2       N               

 

                                                    PLATELET COUNT (test code = 

PLT)            190 K/MM3       129-368         N               

 

                                                    MEAN PLATELET VOLUME (test c

ode 

= MPV)          10.9 fl         7.4-10.4        H               

 

                      NEUTROPHIL % (test code = NT%) 63.4 %     43-75      N    

      

 

                                                    IMMATURE GRANULOCYTE % (test

 

code = IG%)     0.2 %           0.0-2.0         N               

 

                      LYMPHOCYTE % (test code = LY%) 22.1 %     14-44      N    

      

 

                      MONOCYTE % (test code = MO%) 9.0 %      4-13       N      

    

 

                      EOSINOPHIL % (test code = EO%) 4.5 %      0-6        N    

      

 

                      BASOPHIL % (test code = BA%) 0.8 %      0-2        N      

    

 

                                                    NUCLEATED RBC % (test code =

 

NRBC%)          0.0 %           0-1.0           N               

 

                      NEUTROPHIL # (test code = NT#) 3.96 K/mm3 2.0-7.6    N    

      

 

                                                    IMMATURE GRANULOCYTE # (test

 

code = IG#)     0.01 x10 3/uL   0-0.03          N               

 

                      LYMPHOCYTE # (test code = LY#) 1.38 K/mm3 1.0-3.8    N    

      

 

                      MONOCYTE # (test code = MO#) 0.56 K/mm3 0.1-0.8    N      

    

 

                      EOSINOPHIL # (test code = EO#) 0.28 K/mm3 0.0-0.2    H    

      

 

                      BASOPHIL # (test code = BA#) 0.05 K/mm3 0.0-0.2    N      

    

 

                                                    NUCLEATED RBC # (test code =

 

NRBC#)          0.00 K/mm3      0.0-0.1         N               





GLUCOSE BEDSIDE [id number] 20:00:00* 



                      Test Item  Value      Reference Range Interpretation Comme

nts

 

                                                    GLUCOSE BEDSIDE TESTING (mitch

t code 

= GLUBED)       302 MG/DL       60-99                         





GLUCOSE BEDSIDE [id number] 16:19:00* 



                      Test Item  Value      Reference Range Interpretation Comme

nts

 

                                                    GLUCOSE BEDSIDE TESTING (mitch

t code 

= GLUBED)       346 MG/DL       60-99                         





GLUCOSE BEDSIDE [id number] 11:59:00* 



                      Test Item  Value      Reference Range Interpretation Comme

nts

 

                                                    GLUCOSE BEDSIDE TESTING (mitch

t code 

= GLUBED)       265 MG/DL       60-99           H               





PROTHROMBIN [id number] 07:22:00* 



                      Test Item  Value      Reference Range Interpretation Comme

nts

 

                                                    PROTHROMBIN TIME 

PATIENT (test code = 

PTP)            11.2 SECONDS    9.5-12.7        N               

 

                                                    INTERNATIONAL NORMAL 

RATIO (test code = 

INR)            1.0             0.86-1.14       N               The INR is to be

 

used only for 

monitoring oral 

anticoagulanttherap

y. INDICATION  INR 

VALUE--------------

-------------------

-------------------

-------1. 

Prophylaxis, deep 

venous thrombosis, 

including high risk 

surgery. 2.0 - 3.0 

2. Prophylaxis, 

deep venous 

thrombosis, hip 

surgery, treatment 

for deep venous 

thrombosis or 

pulmonary 

prevention of 

systemic embolism 

in patients with 

valvular heart 

disease, atrial 

fibrillation, 

tissue heart valve, 

or acute myocardial 

infarction. 2.0 - 

3.0 3. Mechanical 

prosthesis heart 

valves, recurrent 

systemic embolism.  

3.0 - 4.5





PTT [id number] 07:22:00* 



                      Test Item  Value      Reference Range Interpretation Comme

nts

 

                      PTT ACTIVATED (test code = APTT) 29.6 SECONDS 25.1-36.5  N

          





BASIC METABOLIC [id number] 07:19:00* 



                      Test Item  Value      Reference Range Interpretation Comme

nts

 

                                                    SODIUM (test code = 

NA)             140 MMOL/L      137-145         N               

 

                                                    POTASSIUM (test code = 

K)              4.1 MMOL/L      3.5-5.1         N               

 

                                                    CHLORIDE (test code = 

CL)             95 MMOL/L                 L               

 

                                                    CARBON DIOXIDE (test 

code = CO2)     34 MMOL/L       22-30           H               

 

                                                    GLUCOSE (test code = 

GLU)            254 MG/DL                 H               

 

                                                    BLOOD UREA NITROGEN 

(test code = BUN) 27 MG/DL        9-20            H               

 

                                                    GLOMERULAR FILTRATION 

RATE (test code = GFR) > 60                                            Reporting

 units: 

ml/min/1.73 m2 

(Modified MDRD 

Formula)Reference 

Range: > or = 60 

ml/min/1.73 m2

 

                                                    CREATININE (test code 

= CREAT)        1.10 MG/DL      0.66-1.25       N               

 

                                                    CALCIUM (test code = 

CA)             9.7 MG/DL       8.4-10.2        N               





Is this a LINE draw? [id number] 07:19:00* 



                      Test Item  Value      Reference Range Interpretation Comme

nts

 

                      MAGNESIUM (test code = MAG) 1.3 MG/DL  1.6-2.3    L       

   





Is this a LINE draw? NCBC W/AUTO [id number] 07:09:00* 



                      Test Item  Value      Reference Range Interpretation Comme

nts

 

                                                    WHITE BLOOD CELL (test code 

= 

WBC)            6.7 K/MM3       3.8-9.8         N               

 

                                                    RED BLOOD CELL (test code = 

RBC)            4.34 M/MM3      3.95-5.67       N               

 

                      HEMOGLOBIN (test code = HGB) 12.5 G/DL  12.4-16.7  N      

    

 

                      HEMATOCRIT (test code = HCT) 40.0 %     35.9-49.5  N      

    

 

                                                    MEAN CELL VOLUME (test code 

= 

MCV)            92 fL           81.7-96.1       N               

 

                      MEAN CELL HGB (test code = MCH) 28.8 pg    27.6-33.2  N   

       

 

                                                    MEAN CELL HGB CONCETRATION 

(test code = MCHC) 31.3 %          32.9-35.5       L               

 

                                                    RED CELL DISTRIBUTION WIDTH 

(test code = RDW) 14.1 %          12.1-15.2       N               

 

                                                    PLATELET COUNT (test code = 

PLT)            219 K/MM3       129-368         N               

 

                                                    MEAN PLATELET VOLUME (test c

ode 

= MPV)          10.8 fl         7.4-10.4        H               

 

                      NEUTROPHIL % (test code = NT%) 67.7 %     43-75      N    

      

 

                                                    IMMATURE GRANULOCYTE % (test

 

code = IG%)     0.3 %           0.0-2.0         N               

 

                      LYMPHOCYTE % (test code = LY%) 19.6 %     14-44      N    

      

 

                      MONOCYTE % (test code = MO%) 7.8 %      4-13       N      

    

 

                      EOSINOPHIL % (test code = EO%) 3.9 %      0-6        N    

      

 

                      BASOPHIL % (test code = BA%) 0.7 %      0-2        N      

    

 

                                                    NUCLEATED RBC % (test code =

 

NRBC%)          0.0 %           0-1.0           N               

 

                      NEUTROPHIL # (test code = NT#) 4.54 K/mm3 2.0-7.6    N    

      

 

                                                    IMMATURE GRANULOCYTE # (test

 

code = IG#)     0.02 x10 3/uL   0-0.03          N               

 

                      LYMPHOCYTE # (test code = LY#) 1.31 K/mm3 1.0-3.8    N    

      

 

                      MONOCYTE # (test code = MO#) 0.52 K/mm3 0.1-0.8    N      

    

 

                      EOSINOPHIL # (test code = EO#) 0.26 K/mm3 0.0-0.2    H    

      

 

                      BASOPHIL # (test code = BA#) 0.05 K/mm3 0.0-0.2    N      

    

 

                                                    NUCLEATED RBC # (test code =

 

NRBC#)          0.00 K/mm3      0.0-0.1         N               





Is this a LINE draw? NCOVID 19 Asymptomatic IH [id number] 06:35:00* 



                      Test Item  Value      Reference Range Interpretation Comme

nts

 

                                                    COVID 19 

Asymptomatic IH AG 

(test code = 

COVNONPUIAG)    NEGATIVE        Negative                        "Negative result

s from 

patients with symptom 

onset beyondfive days, 

should be treated as 

presumptive, 

andconfirmation with a 

molecular assay, if 

necessary forpatient 

management may be 

performed. Negative 

results do notrule out 

COVID-19 and should not 

be used as the sole 

basisfor treatment or 

patient management 

decisions, 

includinginfection 

control decisions. 

Negative results should 

beconsidered in the 

context of a patients 

recent exposures,history, 

and the presence of 

clinical signs and 

symptomsconsistent with 

COVID-19.This test 

detects both viable 

andnon-viable SARS-CoV 

and SARS CoV-2.Test 

performance dependson the 

amount of virus (antigen) 

in the sample."





- CT LD LUNG CA SCREENING2021-10-05 14:57:00
************************************************************[facility] [place] WESTName: YULIANA MOORE : 1960 Sex: 
M************************************************************ Patient Name: 
YULIANA MOORE Unit No: [id number] EXAMS: CPT CODE: [id number] CT LD LUNG CA
 SCREENING 23389 EXAM: Screening CT chest without contrast (low dose) LOCATION: 
B2 INDICATION: Lung cancer screening  TECHNIQUE: Axial 2.5 mm images of the 
chest were obtained using low-dose CT technique, with sagittal and coronal 
reformats. This exam was performed according to our departmental dose-optimiza
tion program, which includes automated exposure control, adjustment of the mA 
and/or kV according to patient size, and/or use of iterative reconstruction 
technique. COMPARISON: None DISCUSSION: RISK FACTORS: Age: 60 years Smokin 
pack years Currently smoking: Yes History of cancer: No NODULES: 5 mm right 
upper lobe nodule (axial image 20), adjacent 4 mm right upper lobe nodule (axial
 image 21), 8 mm left upper lobe nodule (axial image 36), and 4 mm left lower 
lobe nodule (axial image 71) are present. 6 mm calcified left upper lobe nodule 
is also noted. Additional findings: Lungs: 8 mm left major fissural lymph node 
is seen. Mediastinum: The heart is normal in size. Coronary artery calcif
ications are present. 0.9 cm hypodense left thyroid nodule is present.  Lymph 
nodes: There is no mediastinal, axillary, or hilar lymphadenopathy. Osseous 
structures/soft tissues: Mild degenerative changes are seen in the spine. Upper 
abdomen: 1.4 cm left superior renal cyst is present. IMPRESSION: Lung-Rads 
Category 4A. RECOMMENDATION: Recommend three-month follow up low-dose CT chest. 
 *******************FOR INTERNAL CODING PURPOSES ONLY**************** RESULT 
CODE: L4A FOLLOW UP: L3 ** Electronically Signed by Taryn Barrientos MD on 
10/05/2021 at 1457 ** Reported and signed by: Taryn Barrientos MD [facility] NAME: YULIANA MOORE [address] [address] PHYS: 
[place] [address] : 1960 AGE: 60 SEX: M 
ACCT NO: [id number] LOC: RUPALZJOSE PHONE #: [phone] EXAM DATE: 10/05/2021 
STATUS: REG CLI FAX #: [phone] RAD #: D/C DT PAGE 1 Signed Report  
(CONTINUED) Patient Name: YULIANA MOORE Unit No: [id number] EXAMS: CPT 
CODE: [id number] CT LD LUNG CA SCREENING [address] (Continued) CC: Manuel ACUÑA  
Technologist: Eve Castillo, RT(R) CTDI: DLP: Trnscrpt: 10/05/2021 (1457) 
t.SDR.PR7 [facility] NAME: YULIANA MOORE [address] [facility] PHYS: [place] [address] :  AGE: 60 SEX: M ACCT NO: [id number] LOC: LIBBYCTS PHONE #: [phone] 
EXAM DATE: 10/05/2021 STATUS: REG CLI FAX #: [phone] RAD #: D/C DT PAGE 2 
Signed Report Patient Name: YULIANA MOORE Unit No: [id number] EXAMS: CPT 
CODE: [id number] CT LD LUNG CA SCREENING [address] (Continued) Orig Print D/T: S: 
10/05/2021 (1636) [facility]  NAME: YULIANA MOORE [address] 
[facility] PHYS: [place] [address] : 
1960 AGE: 60 SEX: M  ACCT NO: [id number] LOC: RUPALZCTS PHONE #: 
[phone] EXAM DATE: 10/05/2021 STATUS: REG CLI FAX #: [phone] RAD #:  
D/C DT PAGE 3 Signed Report- NM MYOCRD SPECT R/S [id number] 18:16:00
************************************************************[facility] [place] WESTName: YULIANA MOORE : 1960  Sex: 
M************************************************************ Patient Name: 
YULIANA MOORE Unit No: [id number] EXAMS:  CPT CODE: [id number] NM MYOCRD 
SPECT R/SMULT 20014 INDICATION: Unstable angina. The patient underwent 
myocardial perfusion imaging utilizing IV injection of 29.9 mCi Tc99M Cardiolite
 at rest and IV injection of 32.5 mCi Tc99M Cardiolite atpeak stress. SPECT 
imaging was obtained at rest and stress. Gated SPECT imaging was obtained at 
stress. Regadenoson protocol was utilized for stress. FINDINGS: There is a mild 
fixed inferior posterior perfusion defect.  CONCLUSIONS: 1. NORMAL REGADENOSON 
TECHNETIUM 99 CARDIOLITE SHOWING A MILD INFERIOR POSTERIOR ATTENUATION ARTIFACT.
 2. GATED PERFUSION IMAGING SHOWS NORMAL LEFT VENTRICULAR SIZE AND SYSTOLIC 
FUNCTION WITH NORMAL WALL MOTION. END-DIASTOLIC VOLUME IS 82 mL, END-SYSTOLIC 
VOLUME IS 25 mL, AND THE EJECTION FRACTION IS 70 %. ** Electronically Signed by 
FRANSISCO Jeffrey ** ** on 2021 at 1816 ** Reported and signed by: 
Edmund Jeffrey M.D.  CC: Edmund Jeffrey Technologist: Vianey Moore, 
RT(R)(M); Meg Hollins RT(N) Transcrpt Date/Tm/Trnsp: 2021 () jeferson GUERREROGSP Orig Print D/T: S: 2021 () [facility] NAME:
 YULIANA MOORE [address] [place] [address] PHYS: [place] [address] : 1960 AGE: 60  SEX: M ACCT NO: [id number] 
LOC: RUPALZNUC PHONE #: [phone] EXAM DATE: 2021 STATUS: DEP CLI FAX #: 
[phone]  RADIOLOGY NO: PAGE 1 Signed ReportPOCT GLUCOSE (AUTOMATED)
2020 16:34:00* 



                      Test Item  Value      Reference Range Interpretation Comme

nts

 

                      POCT GLU (test code = 4740218206) >600            HH

         

 

                                                    Lab Interpretation (test cod

e = 

42843-7)        Abnormal                                        





[facility] GLUCOSE (AUTOMATED)2020 16:34:00* 



                      Test Item  Value      Reference Range Interpretation Comme

nts

 

                      POCT GLU (test code = 2144711865) >600            HH

         

 

                                                    Lab Interpretation (test cod

e = 

36577-9)        Abnormal                                        





[facility] GLUCOSE (AUTOMATED)2020 13:51:00* 



                      Test Item  Value      Reference Range Interpretation Comme

nts

 

                      POCT GLU (test code = 4146098489) 266 mg/dL       H 

         

 

                                                    Lab Interpretation (test cod

e = 

40531-6)        Abnormal                                        





[facility] Metabolic Panel (NA, K, CL, CO2, 
GLUCOSE, BUN, CREATININE, CA)2020 11:45:00* 



                      Test Item  Value      Reference Range Interpretation Comme

nts

 

                                                    NA (test code = 

4974548761)     137 mmol/L      135-145                         

 

                                                    K (test code = 

1494171374)     4.3 mmol/L      3.5-5                           

 

                                                    CL (test code = 

3358212660)     105 mmol/L                                

 

                                                    CO2 TOTAL (test code = 

0394935063)     26 mmol/L       23-31                           

 

                                                    AGAP (test code = 

7713986894)                     2-16                            

 

                                                    BUN (test code = 

5445456628)     29 mg/dL        7-23            H               

 

                                                    GLUCOSE (test code = 

2441784104)     270 mg/dL                 H               

 

                                                    CREATININE (test code = 

1974393997)     1.13 mg/dL      0.6-1.25                        

 

                                                    CALCIUM (test code = 

9316196012)     9.1 mg/dL       8.6-10.6                        

 

                                                    eGFR Calculation 

(Non-) 

(test code = 

4071863304)                     mL/min/1.73m2                   

 

                                                    eGFR Calculation 

() 

(test code = 

3628823707)                     mL/min/1.73m2                   

 

                                        MARGY (test code = MARGY) Association of 

Glomerular Filtration 

Rate (GFR) and Staging 

of Kidney Disease* 

+---------------------

--+-------------------

--+-------------------

------+| GFR 

(mL/min/1.73 m2) ?| 

With Kidney Damage ?| 

?Without Kidney 

Damage+---------------

--------+-------------

--------+-------------

------------+| ?>90 ? 

? ? ? ? ? ? ? ?| 

?Stage one ? ? ? ? ?| 

? Normal ? ? ? ? ? ? ? 

?+--------------------

---+------------------

---+------------------

-------+| ?60-89 ? ? ? 

? ? ? ? ?| ?Stage two 

? ? ? ? ?| ? Decreased 

GFR ? ? ? ? 

+---------------------

--+-------------------

--+-------------------

------+| ?30-59 ? ? ? 

? ? ? ? ?| ?Stage 

three ? ? ? ?| ? Stage 

three ? ? ? ? ? 

+---------------------

--+-------------------

--+-------------------

------+| ?15-29 ? ? ? 

? ? ? ? ?| ?Stage four 

? ? ? ? | ? Stage four 

? ? ? ? ? 

?+--------------------

---+------------------

---+------------------

-------+| ?<15 (or 

dialysis) ? ?| ?Stage 

five ? ? ? ? | ? Stage 

five ? ? ? ? ? 

?+--------------------

---+------------------

---+------------------

-------+ *Each stage 

assumes the associated 

GFR level has been in 

effect for at least 

three months. ?Stages 

1 to 5, with or 

without kidney 

disease, indicate 

chronic kidney 

disease. Notes: 

Determination of 

stages one and two 

(with eGFR 

>59mL/min/1.73 m2) 

requires estimation of 

kidney damage for at 

least three months as 

defined by structural 

or functional 

abnormalities of the 

kidney, manifested by 

either:Pathological 

abnormalities or 

Markers of kidney 

damage (including 

abnormalities in the 

composition of the 

blood or urine or 

abnormalities in 

imaging tests).                                             

 

                                                    Lab Interpretation 

(test code = 42469-1) Abnormal                                        





[facility] GLUCOSE (AUTOMATED)2020 01:40:00* 



                      Test Item  Value      Reference Range Interpretation Comme

nts

 

                      POCT GLU (test code = 5080894203) 271 mg/dL       H 

         

 

                                                    Lab Interpretation (test cod

e = 

82675-1)        Abnormal                                        





[facility] GLUCOSE (AUTOMATED)2020 21:55:00* 



                      Test Item  Value      Reference Range Interpretation Comme

nts

 

                      POCT GLU (test code = 1504098466) 211 mg/dL       H 

         

 

                                                    Lab Interpretation (test cod

e = 

73316-5)        Abnormal                                        





[facility] GLUCOSE (AUTOMATED)2020 17:43:00* 



                      Test Item  Value      Reference Range Interpretation Comme

nts

 

                      POCT GLU (test code = 3658419921) 324 mg/dL       H 

         

 

                                                    Lab Interpretation (test cod

e = 

02571-9)        Abnormal                                        





[facility] GLUCOSE (AUTOMATED)2020 14:50:00* 



                      Test Item  Value      Reference Range Interpretation Comme

nts

 

                      POCT GLU (test code = 7896769187) 362 mg/dL       H 

         

 

                                                    Lab Interpretation (test cod

e = 

92241-9)        Abnormal                                        





[facility] GLUCOSE (AUTOMATED)2020 13:55:00* 



                      Test Item  Value      Reference Range Interpretation Comme

nts

 

                      POCT GLU (test code = 5052042254) 276 mg/dL       H 

         

 

                                                    Lab Interpretation (test cod

e = 

58082-8)        Abnormal                                        





[facility] 



                      Test Item  Value      Reference Range Interpretation Comme

nts

 

                                                    TROPONIN I (test 

code = 0633614055) 0.007 ng/mL     See_Comment                     [Automated 

message] The 

system which 

generated this 

result 

transmitted 

reference range: 

<=0.034. The 

reference range 

was not used to 

interpret this 

result as 

normal/abnormal.

 

                                                    MARGY (test code = 

MARGY)                                    Equal or Less than 

0.034 

ng/ml---Normal 

?Note: Cardiac 

troponin begins to 

rise 3-4 hours 

after the onset of 

ischemia. Repeat 

in 4-6 hours if 

the sample was 

drawn within 3-4 

hours of the onset 

of the symptom and 

found normal. 

Between 0.035 and 

0.120 ng/mL--- 

Borderline. 

Questionable 

myocardial injury 

or necrosis ? 

?Note: Serial 

measurement may be 

necessary to 

confirm or exclude 

the diagnosis of 

myocardial injury 

or necrosis; 

Clinical 

correlation 

(symptoms, EKGs, 

imaging studies, 

and others) 

required; Repeat 

in 4-6 hours if 

clinically 

indicated. ? ? ? ? 

Equal or Higher 

than 0.121 

ng/mL---Abnormal. 

Myocardial Injury 

or Necrosis Likely 

? ? ? ? Biotin has 

been reported to 

cause a negative 

bias, interpret 

results relative 

to patient's use 

of biotin. ? ? ? ? 

? ? ? ? ? ? ? ? ? 

?                                                           

 

                                                    Lab Interpretation 

(test code = 

54859-4)        Normal                                          





[facility] 



                      Test Item  Value      Reference Range Interpretation Comme

nts

 

                      POCT GLU (test code = 7011576172) 184 mg/dL       H 

         

 

                                                    Lab Interpretation (test cod

e = 

48704-1)        Abnormal                                        





[id number]07[facility]* 



                      Test Item  Value      Reference Range Interpretation Comme

nts

 

                      MAGNESIUM (test code = 9077039946) 1.6 mg/dL  1.7-2.4    L

          

 

                                                    Lab Interpretation (test cod

e = 

71144-0)        Abnormal                                        





[facility] ([facility], K, CL, CO2, 
GLUCOSE, BUN, CREATININE, CA)2020 11:07:00* 



                      Test Item  Value      Reference Range Interpretation Comme

nts

 

                                                    NA (test code = 

4839729775)     136 mmol/L      135-145                         

 

                                                    K (test code = 

6398838474)     4.1 mmol/L      3.5-5                           

 

                                                    CL (test code = 

0375844000)     102 mmol/L                                

 

                                                    CO2 TOTAL (test code = 

2996847710)     25 mmol/L       23-31                           

 

                                                    AGAP (test code = 

3988977256)                     2-16                            

 

                                                    BUN (test code = 

2028036952)     32 mg/dL        7-23            H               

 

                                                    GLUCOSE (test code = 

9356310254)     162 mg/dL                 H               

 

                                                    CREATININE (test code = 

4406316449)     1.34 mg/dL      0.6-1.25        H               

 

                                                    CALCIUM (test code = 

6399743356)     9.0 mg/dL       8.6-10.6                        

 

                                                    eGFR Calculation 

(Non-) 

(test code = 

4303315264)                     mL/min/1.73m2                   

 

                                                    eGFR Calculation 

() 

(test code = 

3888980958)                     mL/min/1.73m2                   

 

                                        MARGY (test code = MARGY) Association of 

Glomerular Filtration 

Rate (GFR) and Staging 

of Kidney Disease* 

+---------------------

--+-------------------

--+-------------------

------+| GFR 

(mL/min/1.73 m2) ?| 

With Kidney Damage ?| 

?Without Kidney 

Damage+---------------

--------+-------------

--------+-------------

------------+| ?>90 ? 

? ? ? ? ? ? ? ?| 

?Stage one ? ? ? ? ?| 

? Normal ? ? ? ? ? ? ? 

?+--------------------

---+------------------

---+------------------

-------+| ?60-89 ? ? ? 

? ? ? ? ?| ?Stage two 

? ? ? ? ?| ? Decreased 

GFR ? ? ? ? 

+---------------------

--+-------------------

--+-------------------

------+| ?30-59 ? ? ? 

? ? ? ? ?| ?Stage 

three ? ? ? ?| ? Stage 

three ? ? ? ? ? 

+---------------------

--+-------------------

--+-------------------

------+| ?15-29 ? ? ? 

? ? ? ? ?| ?Stage four 

? ? ? ? | ? Stage four 

? ? ? ? ? 

?+--------------------

---+------------------

---+------------------

-------+| ?<15 (or 

dialysis) ? ?| ?Stage 

five ? ? ? ? | ? Stage 

five ? ? ? ? ? 

?+--------------------

---+------------------

---+------------------

-------+ *Each stage 

assumes the associated 

GFR level has been in 

effect for at least 

three months. ?Stages 

1 to 5, with or 

without kidney 

disease, indicate 

chronic kidney 

disease. Notes: 

Determination of 

stages one and two 

(with eGFR 

>59mL/min/1.73 m2) 

requires estimation of 

kidney damage for at 

least three months as 

defined by structural 

or functional 

abnormalities of the 

kidney, manifested by 

either:Pathological 

abnormalities or 

Markers of kidney 

damage (including 

abnormalities in the 

composition of the 

blood or urine or 

abnormalities in 

imaging tests).                                             

 

                                                    Lab Interpretation 

(test code = 88516-8) Abnormal                                        





[facility] WITH [id number] 11:07:00* 



                      Test Item  Value      Reference Range Interpretation Comme

nts

 

                                                    WBC (test code = 

6690-2)                         See_Comment                     [Automated messa

ge] 

The system which 

generated this 

result transmitted 

reference range: 

4.20 - 10.70 

10*3/?L. The 

reference range was 

not used to 

interpret this 

result as 

normal/abnormal.

 

                                                    RBC (test code = 

789-8)                          See_Comment     L               [Automated messa

ge] 

The system which 

generated this 

result transmitted 

reference range: 

4.26 - 5.52 

10*6/?L. The 

reference range was 

not used to 

interpret this 

result as 

normal/abnormal.

 

                                                    HGB (test code = 

718-7)          12.4 g/dL       12.2-16.4                       

 

                                                    HCT (test code = 

4544-3)         37.1 %          38.4-49.3       L               

 

                                                    MCV (test code = 

787-2)          87.3 fL         81.7-95.6                       

 

                                                    MCH (test code = 

785-6)          29.2 pg         26.1-32.7                       

 

                                                    MCHC (test code = 

786-4)          33.4 g/dL       31.2-35                         

 

                                                    RDW-SD (test code = 

89002-6)        41.5 fL         38.5-51.6                       

 

                                                    RDW-CV (test code = 

788-0)          13.2 %          12.1-15.4                       

 

                                                    PLT (test code = 

777-3)                          See_Comment                     [Automated messa

ge] 

The system which 

generated this 

result transmitted 

reference range: 

150 - 328 10*3/?L. 

The reference range 

was not used to 

interpret this 

result as 

normal/abnormal.

 

                                                    MPV (test code = 

01140-4)        11.3 fL         9.8-13                          

 

                                                    NRBC/100 WBC (test 

code = 2368819980)                 See_Comment                     [Automated me

ssage] 

The system which 

generated this 

result transmitted 

reference range: 

0.0 - 10.0 /100 

WBCs. The reference 

range was not used 

to interpret this 

result as 

normal/abnormal.

 

                                                    NRBC x10^3 (test code 

= 2050181738)   <0.01           See_Comment                     [Automated messa

ge] 

The system which 

generated this 

result transmitted 

reference range: 

10*3/?L. The 

reference range was 

not used to 

interpret this 

result as 

normal/abnormal.

 

                                                    GRAN MAT (NEUT) % 

(test code = 770-8) 56.6 %                                          

 

                                                    IMM GRAN % (test code 

= 7872509348)   0.00 %                                          

 

                                                    LYMPH % (test code = 

736-9)          29.4 %                                          

 

                                                    MONO % (test code = 

5905-5)         7.8 %                                           

 

                                                    EOS % (test code = 

713-8)          5.2 %                                           

 

                                                    BASO % (test code = 

706-2)          1.0 %                                           

 

                                                    GRAN MAT x10^3(ANC) 

(test code = 

0520593148)     3.48 10*3/uL    1.99-6.95                       

 

                                                    IMM GRAN x10^3 (test 

code = 5918691328) <0.03           0-0.06                          

 

                                                    LYMPH x10^3 (test code 

= 731-0)        1.81 10*3/uL    1.09-3.23                       

 

                                                    MONO x10^3 (test code 

= 742-7)        0.48 10*3/uL    0.36-1.02                       

 

                                                    EOS x10^3 (test code = 

711-2)          0.32 10*3/uL    0.06-0.53                       

 

                                                    BASO x10^3 (test code 

= 704-7)        0.06 10*3/uL    0.01-0.09                       

 

                                                    Lab Interpretation 

(test code = 99059-6) Abnormal                                        





[facility] 



                      Test Item  Value      Reference Range Interpretation Comme

nts

 

                      POCT GLU (test code = 9390591794) 105 mg/dL         

         

 

                                                    Lab Interpretation (test cod

e = 

14562-3)        Normal                                          





[facility] 



                      Test Item  Value      Reference Range Interpretation Comme

nts

 

                                                    TROPONIN I (test 

code = 9851197607) 0.009 ng/mL     See_Comment                     [Automated 

message] The 

system which 

generated this 

result 

transmitted 

reference range: 

<=0.034. The 

reference range 

was not used to 

interpret this 

result as 

normal/abnormal.

 

                                                    MARGY (test code = 

MARGY)                                    Equal or Less than 

0.034 

ng/ml---Normal 

?Note: Cardiac 

troponin begins to 

rise 3-4 hours 

after the onset of 

ischemia. Repeat 

in 4-6 hours if 

the sample was 

drawn within 3-4 

hours of the onset 

of the symptom and 

found normal. 

Between 0.035 and 

0.120 ng/mL--- 

Borderline. 

Questionable 

myocardial injury 

or necrosis ? 

?Note: Serial 

measurement may be 

necessary to 

confirm or exclude 

the diagnosis of 

myocardial injury 

or necrosis; 

Clinical 

correlation 

(symptoms, EKGs, 

imaging studies, 

and others) 

required; Repeat 

in 4-6 hours if 

clinically 

indicated. ? ? ? ? 

Equal or Higher 

than 0.121 

ng/mL---Abnormal. 

Myocardial Injury 

or Necrosis Likely 

? ? ? ? Biotin has 

been reported to 

cause a negative 

bias, interpret 

results relative 

to patient's use 

of biotin. ? ? ? ? 

? ? ? ? ? ? ? ? ? 

?                                                           

 

                                                    Lab Interpretation 

(test code = 

22941-0)        Normal                                          





[facility]NA, K, CL, CO2, 
GLUCOSE, BUN, CREATININE, [place])2020 04:07:00* 



                      Test Item  Value      Reference Range Interpretation Comme

nts

 

                                                    NA (test code = 

0552819372)     134 mmol/L      135-145         L               

 

                                                    K (test code = 

2665313304)     4.3 mmol/L      3.5-5                           

 

                                                    CL (test code = 

0275051735)     101 mmol/L                                

 

                                                    CO2 TOTAL (test code = 

6865197521)     26 mmol/L       23-31                           

 

                                                    AGAP (test code = 

0455767103)                     2-16                            

 

                                                    BUN (test code = 

9839161040)     39 mg/dL        7-23            H               

 

                                                    GLUCOSE (test code = 

1617871384)     242 mg/dL                 H               

 

                                                    CREATININE (test code = 

0694426666)     1.57 mg/dL      0.6-1.25        H               

 

                                                    CALCIUM (test code = 

8007412568)     9.3 mg/dL       8.6-10.6                        

 

                                                    eGFR Calculation 

(Non-) 

(test code = 

3670724034)                     mL/min/1.73m2                   

 

                                                    eGFR Calculation 

() 

(test code = 

6201671271)                     mL/min/1.73m2                   

 

                                        MARGY (test code = MARGY) Association of 

Glomerular Filtration 

Rate (GFR) and Staging 

of Kidney Disease* 

+---------------------

--+-------------------

--+-------------------

------+| GFR 

(mL/min/1.73 m2) ?| 

With Kidney Damage ?| 

?Without Kidney 

Damage+---------------

--------+-------------

--------+-------------

------------+| ?>90 ? 

? ? ? ? ? ? ? ?| 

?Stage one ? ? ? ? ?| 

? Normal ? ? ? ? ? ? ? 

?+--------------------

---+------------------

---+------------------

-------+| ?60-89 ? ? ? 

? ? ? ? ?| ?Stage two 

? ? ? ? ?| ? Decreased 

GFR ? ? ? ? 

+---------------------

--+-------------------

--+-------------------

------+| ?30-59 ? ? ? 

? ? ? ? ?| ?Stage 

three ? ? ? ?| ? Stage 

three ? ? ? ? ? 

+---------------------

--+-------------------

--+-------------------

------+| ?15-29 ? ? ? 

? ? ? ? ?| ?Stage four 

? ? ? ? | ? Stage four 

? ? ? ? ? 

?+--------------------

---+------------------

---+------------------

-------+| ?<15 (or 

dialysis) ? ?| ?Stage 

five ? ? ? ? | ? Stage 

five ? ? ? ? ? 

?+--------------------

---+------------------

---+------------------

-------+ *Each stage 

assumes the associated 

GFR level has been in 

effect for at least 

three months. ?Stages 

1 to 5, with or 

without kidney 

disease, indicate 

chronic kidney 

disease. Notes: 

Determination of 

stages one and two 

(with eGFR 

>59mL/min/1.73 m2) 

requires estimation of 

kidney damage for at 

least three months as 

defined by structural 

or functional 

abnormalities of the 

kidney, manifested by 

either:Pathological 

abnormalities or 

Markers of kidney 

damage (including 

abnormalities in the 

composition of the 

blood or urine or 

abnormalities in 

imaging tests).                                             

 

                                                    Lab Interpretation 

(test code = 27647-8) Abnormal                                        





[facility] [id number] 04:04:48No acute 
intrathoracic abnormality.PROCEDURE: XR CHEST 1 VW CLINICAL INDICATION: chest 
pain COMPARISON: 2019 FINDINGS: The lungs are partial expanded and clear. 
Perihilar vessels are mildlyprominent. No pleural effusion or pneumothorax is 
seen. The cardiomediastinal silhouette is normal. No acute bony abnormality. 
[facility], [facility] Results [employer] User - 2020 10:05 PM CSTPROCEDURE: XR CHEST 1 V
WCLINICAL INDICATION: chest pain COMPARISON: 2019FINDINGS:The lungs are 
partial expanded and clear. Perihilar vessels are mildlyprominent. No pleural 
effusion or pneumothorax is seen. The cardiomediastinal silhouette is normal. No
acute bony abnormality.IMPRESSIONNo acute intrathoracic abnormality.[facility] (NA, K, CL, CO2, GLUCOSE, BUN, 
CREATININE, CA)2020 02:02:00* 



                      Test Item  Value      Reference Range Interpretation Comme

nts

 

                                                    NA (test code = 

1125355027)     132 mmol/L      135-145         L               

 

                                                    K (test code = 

6869570425)     4.7 mmol/L      3.5-5                           

 

                                                    CL (test code = 

5401973204)     98 mmol/L                                 

 

                                                    CO2 TOTAL (test code = 

4259867856)     25 mmol/L       23-31                           

 

                                                    AGAP (test code = 

4355292387)                     2-16                            

 

                                                    BUN (test code = 

0486546688)     40 mg/dL        7-23            H               

 

                                                    GLUCOSE (test code = 

8532587447)     448 mg/dL                 H               

 

                                                    CREATININE (test code = 

7199401426)     1.83 mg/dL      0.6-1.25        H               

 

                                                    CALCIUM (test code = 

3175140021)     9.1 mg/dL       8.6-10.6                        

 

                                                    eGFR Calculation 

(Non-) 

(test code = 

7411384241)                     mL/min/1.73m2                   

 

                                                    eGFR Calculation 

() 

(test code = 

2045765354)                     mL/min/1.73m2                   

 

                                        MARGY (test code = MARGY) Association of 

Glomerular Filtration 

Rate (GFR) and Staging 

of Kidney Disease* 

+---------------------

--+-------------------

--+-------------------

------+| GFR 

(mL/min/1.73 m2) ?| 

With Kidney Damage ?| 

?Without Kidney 

Damage+---------------

--------+-------------

--------+-------------

------------+| ?>90 ? 

? ? ? ? ? ? ? ?| 

?Stage one ? ? ? ? ?| 

? Normal ? ? ? ? ? ? ? 

?+--------------------

---+------------------

---+------------------

-------+| ?60-89 ? ? ? 

? ? ? ? ?| ?Stage two 

? ? ? ? ?| ? Decreased 

GFR ? ? ? ? 

+---------------------

--+-------------------

--+-------------------

------+| ?30-59 ? ? ? 

? ? ? ? ?| ?Stage 

three ? ? ? ?| ? Stage 

three ? ? ? ? ? 

+---------------------

--+-------------------

--+-------------------

------+| ?15-29 ? ? ? 

? ? ? ? ?| ?Stage four 

? ? ? ? | ? Stage four 

? ? ? ? ? 

?+--------------------

---+------------------

---+------------------

-------+| ?<15 (or 

dialysis) ? ?| ?Stage 

five ? ? ? ? | ? Stage 

five ? ? ? ? ? 

?+--------------------

---+------------------

---+------------------

-------+ *Each stage 

assumes the associated 

GFR level has been in 

effect for at least 

three months. ?Stages 

1 to 5, with or 

without kidney 

disease, indicate 

chronic kidney 

disease. Notes: 

Determination of 

stages one and two 

(with eGFR 

>59mL/min/1.73 m2) 

requires estimation of 

kidney damage for at 

least three months as 

defined by structural 

or functional 

abnormalities of the 

kidney, manifested by 

either:Pathological 

abnormalities or 

Markers of kidney 

damage (including 

abnormalities in the 

composition of the 

blood or urine or 

abnormalities in 

imaging tests).                                             

 

                                                    Lab Interpretation 

(test code = 06168-7) Abnormal                                        





[facility] 
[facility] 



                                Test Item       Value           Reference 

Range                     Interpretation            Comments

 

                                                    HGB A1C (test code = 

4548-4)                         See_Comment     H               [Automated 

message] The 

system which 

generated this 

result 

transmitted 

reference range: 

4.0 - 6.0 % 

NGSP. The 

reference range 

was not used to 

interpret this 

result as 

normal/abnormal.

 

                                                    MARGY (test code = 

MARGY)                                    %A1C (NGSP) 

Interpretation 

(ADA)4.8-5.6 ? ? 

Normal or 

(Non-Diabetic 

Range)5.7-6.4 ? ? 

Increased Risk 

(Pre-Diabetic)>6.5 ? 

? ? ?Diabetes 

Indicated                                                   

 

                                                    Lab Interpretation 

(test code = 

36041-3)        Abnormal                                        





[facility] GLUCOSE (AUTOMATED)2020 23:41:00* 



                      Test Item  Value      Reference Range Interpretation Comme

nts

 

                      POCT GLU (test code = 8285440185) 504 mg/dL       

         

 

                                                    Lab Interpretation (test cod

e = 

60001-6)        Abnormal                                        





[facility] GLUCOSE (AUTOMATED)2020 22:13:00* 



                      Test Item  Value      Reference Range Interpretation Comme

nts

 

                      POCT GLU (test code = 1874479697) 495 mg/dL       

         

 

                                                    Lab Interpretation (test cod

e = 

55382-3)        Abnormal                                        





[facility] 



                      Test Item  Value      Reference Range Interpretation Comme

nts

 

                                                    TROPONIN I (test 

code = 5970799961) 0.007 ng/mL     See_Comment                     [Automated 

message] The 

system which 

generated this 

result 

transmitted 

reference range: 

<=0.034. The 

reference range 

was not used to 

interpret this 

result as 

normal/abnormal.

 

                                                    MARGY (test code = 

MARGY)                                    Equal or Less than 

0.034 

ng/ml---Normal 

?Note: Cardiac 

troponin begins to 

rise 3-4 hours 

after the onset of 

ischemia. Repeat 

in 4-6 hours if 

the sample was 

drawn within 3-4 

hours of the onset 

of the symptom and 

found normal. 

Between 0.035 and 

0.120 ng/mL--- 

Borderline. 

Questionable 

myocardial injury 

or necrosis ? 

?Note: Serial 

measurement may be 

necessary to 

confirm or exclude 

the diagnosis of 

myocardial injury 

or necrosis; 

Clinical 

correlation 

(symptoms, EKGs, 

imaging studies, 

and others) 

required; Repeat 

in 4-6 hours if 

clinically 

indicated. ? ? ? ? 

Equal or Higher 

than 0.121 

ng/mL---Abnormal. 

Myocardial Injury 

or Necrosis Likely 

? ? ? ? Biotin has 

been reported to 

cause a negative 

bias, interpret 

results relative 

to patient's use 

of biotin. ? ? ? ? 

? ? ? ? ? ? ? ? ? 

?                                                           

 

                                                    Lab Interpretation 

(test code = 

47570-6)        Normal                                          





[facility]177[facility] 
19:45:00* 



                      Test Item  Value      Reference Range Interpretation Comme

nts

 

                                                    NA (test code = 

1882876153)     127 mmol/L      135-145         L               

 

                                                    K (test code = 

3769613966)     5.0 mmol/L      3.5-5                           

 

                                                    CL (test code = 

1708255854)     93 mmol/L                 L               

 

                                                    CO2 TOTAL (test code = 

9801317498)     24 mmol/L       23-31                           

 

                                                    AGAP (test code = 

5850661556)                     2-16                            

 

                                                    BUN (test code = 

2290946479)     40 mg/dL        7-23            H               

 

                                                    GLUCOSE (test code = 

7565724335)     716 mg/dL                 HH              

 

                                                    CREATININE (test code = 

6514824652)     2.14 mg/dL      0.6-1.25        H               

 

                                                    TOTAL BILI (test code = 

9289936937)     0.4 mg/dL       0.1-1.1                         

 

                                                    CALCIUM (test code = 

0468485817)     9.1 mg/dL       8.6-10.6                        

 

                                                    T PROTEIN (test code = 

7781391229)     6.9 g/dL        6.3-8.2                         

 

                                                    ALBUMIN (test code = 

8057705317)     4.3 g/dL        3.5-5                           

 

                                                    ALK PHOS (test code = 

1797350320)     106 U/L                                   

 

                                                    ALTv (test code = 

1742-6)         16 U/L          5-50                            

 

                                                    AST(SGOT) (test code = 

5363698919)     19 U/L          13-40                           

 

                                                    eGFR Calculation 

(Non-) 

(test code = 

7069313176)                     mL/min/1.73m2                   

 

                                                    eGFR Calculation 

() 

(test code = 

4123996338)                     mL/min/1.73m2                   

 

                                        MARGY (test code = MARGY) Association of 

Glomerular Filtration 

Rate (GFR) and Staging 

of Kidney Disease* 

+---------------------

--+-------------------

--+-------------------

------+| GFR 

(mL/min/1.73 m2) ?| 

With Kidney Damage ?| 

?Without Kidney 

Damage+---------------

--------+-------------

--------+-------------

------------+| ?>90 ? 

? ? ? ? ? ? ? ?| 

?Stage one ? ? ? ? ?| 

? Normal ? ? ? ? ? ? ? 

?+--------------------

---+------------------

---+------------------

-------+| ?60-89 ? ? ? 

? ? ? ? ?| ?Stage two 

? ? ? ? ?| ? Decreased 

GFR ? ? ? ? 

+---------------------

--+-------------------

--+-------------------

------+| ?30-59 ? ? ? 

? ? ? ? ?| ?Stage 

three ? ? ? ?| ? Stage 

three ? ? ? ? ? 

+---------------------

--+-------------------

--+-------------------

------+| ?15-29 ? ? ? 

? ? ? ? ?| ?Stage four 

? ? ? ? | ? Stage four 

? ? ? ? ? 

?+--------------------

---+------------------

---+------------------

-------+| ?<15 (or 

dialysis) ? ?| ?Stage 

five ? ? ? ? | ? Stage 

five ? ? ? ? ? 

?+--------------------

---+------------------

---+------------------

-------+ *Each stage 

assumes the associated 

GFR level has been in 

effect for at least 

three months. ?Stages 

1 to 5, with or 

without kidney 

disease, indicate 

chronic kidney 

disease. Notes: 

Determination of 

stages one and two 

(with eGFR 

>59mL/min/1.73 m2) 

requires estimation of 

kidney damage for at 

least three months as 

defined by structural 

or functional 

abnormalities of the 

kidney, manifested by 

either:Pathological 

abnormalities or 

Markers of kidney 

damage (including 

abnormalities in the 

composition of the 

blood or urine or 

abnormalities in 

imaging tests).                                             

 

                                                    Lab Interpretation 

(test code = 39156-1) Abnormal                                        





[facility] [id number] 19:30:00* 



                      Test Item  Value      Reference Range Interpretation Comme

nts

 

                                                    APPEARANCE (test code = 

2802765292)     Clear           Clear                           

 

                                                    COLOR (test code = 

9626281672)     Yellow          Yellow                          

 

                                                    PH (test code = 

3376177839)                     4.8-8.0                         

 

                                                    SP GRAVITY (test code = 

8510583176)                     1.003-1.030                     

 

                                                    GLU U QUAL (test code = 

8110839369)     500 mg/dL       Normal          A               

 

                                                    BLOOD (test code = 

0002469213)     Negative        Negative                        

 

                                                    KETONES (test code = 

4376077287)     Negative        Negative                        

 

                                                    PROTEIN (test code = 

2887-8)         Negative        Negative                        

 

                                                    UROBILIN (test code = 

6685342479)     Normal          Normal                          

 

                                                    BILIRUBIN (test code = 

7718076593)     Negative        Negative                        

 

                                                    NITRITE (test code = 

3876933517)     Negative        Negative                        

 

                                                    LEUK AMAYA (test code = 

7106927718)     Negative        Negative                        

 

                                                    RBC/HPF (test code = 

9231197033)                     See_Comment                     [Automated messa

ge] 

The system which 

generated this 

result transmitted 

reference range: 0 - 

3 HPF. The reference 

range was not used 

to interpret this 

result as 

normal/abnormal.

 

                                                    WBC/HPF (test code = 

7680341888)                     See_Comment                     [Automated messa

ge] 

The system which 

generated this 

result transmitted 

reference range: 0 - 

5 HPF. The reference 

range was not used 

to interpret this 

result as 

normal/abnormal.

 

                                                    BACTERIA (test code = 

1917115383)     Negative        Negative                        

 

                                                    MUCOUS (test code = 

5074362080)     Slight          Negative LPF    A               

 

                                                    SQ EPITH (test code = 

4782629873)     <1              HPF                             

 

                                                    HYAL CAST (test code = 

0831177113)                     See_Comment     H               [Automated messa

ge] 

The system which 

generated this 

result transmitted 

reference range: <=2 

LPF. The reference 

range was not used 

to interpret this 

result as 

normal/abnormal.

 

                                                    Lab Interpretation (test 

code = 39554-3) Abnormal                                        





[facility] VENOUS BLOOD [id number] 19:20:00
  * 



                      Test Item  Value      Reference Range Interpretation Comme

nts

 

                                                    PH (test code = 

5020999050)                     7.32-7.42                       

 

                                                    PCO2 MEGGAN (test code = 

4982503511)                     See_Comment     L               [Automated messa

ge] 

The system which 

generated this result 

transmitted reference 

range: 41 - 51 mmHg. 

The reference range 

was not used to 

interpret this result 

as normal/abnormal.

 

                                                    PO2 MEGGAN (test code = 

3318222903)                     See_Comment     H               [Automated messa

ge] 

The system which 

generated this result 

transmitted reference 

range: 25 - 40 mmHg. 

The reference range 

was not used to 

interpret this result 

as normal/abnormal.

 

                                                    HCO3 MEGGAN (test code = 

2100018107)                     See_Comment     L               [Automated messa

ge] 

The system which 

generated this result 

transmitted reference 

range: 24 - 28 mEq/L. 

The reference range 

was not used to 

interpret this result 

as normal/abnormal.

 

                                                    AC VBE(BEAKER) (test 

code = 0428564604)                 mEq/L                           

 

                                                    Lab Interpretation (test 

code = 08964-6) Abnormal                                        





[facility] WITH [id number] 19:20:00* 



                      Test Item  Value      Reference Range Interpretation Comme

nts

 

                                                    WBC (test code = 

6690-2)                         See_Comment                     [Automated messa

ge] 

The system which 

generated this 

result transmitted 

reference range: 

4.20 - 10.70 

10*3/?L. The 

reference range was 

not used to 

interpret this 

result as 

normal/abnormal.

 

                                                    RBC (test code = 

789-8)                          See_Comment                     [Automated messa

ge] 

The system which 

generated this 

result transmitted 

reference range: 

4.26 - 5.52 

10*6/?L. The 

reference range was 

not used to 

interpret this 

result as 

normal/abnormal.

 

                                                    HGB (test code = 

718-7)          13.1 g/dL       12.2-16.4                       

 

                                                    HCT (test code = 

4544-3)         38.1 %          38.4-49.3       L               

 

                                                    MCV (test code = 

787-2)          86.2 fL         81.7-95.6                       

 

                                                    MCH (test code = 

785-6)          29.6 pg         26.1-32.7                       

 

                                                    MCHC (test code = 

786-4)          34.4 g/dL       31.2-35                         

 

                                                    RDW-SD (test code = 

46321-9)        40.6 fL         38.5-51.6                       

 

                                                    RDW-CV (test code = 

788-0)          13.2 %          12.1-15.4                       

 

                                                    PLT (test code = 

777-3)                          See_Comment                     [Automated messa

ge] 

The system which 

generated this 

result transmitted 

reference range: 

150 - 328 10*3/?L. 

The reference range 

was not used to 

interpret this 

result as 

normal/abnormal.

 

                                                    MPV (test code = 

94753-5)        11.7 fL         9.8-13                          

 

                                                    NRBC/100 WBC (test 

code = 4222375265)                 See_Comment                     [Automated me

ssage] 

The system which 

generated this 

result transmitted 

reference range: 

0.0 - 10.0 /100 

WBCs. The reference 

range was not used 

to interpret this 

result as 

normal/abnormal.

 

                                                    NRBC x10^3 (test code 

= 2665136095)   <0.01           See_Comment                     [Automated messa

ge] 

The system which 

generated this 

result transmitted 

reference range: 

10*3/?L. The 

reference range was 

not used to 

interpret this 

result as 

normal/abnormal.

 

                                                    GRAN MAT (NEUT) % 

(test code = 770-8) 76.3 %                                          

 

                                                    IMM GRAN % (test code 

= 5424750067)   0.40 %                                          

 

                                                    LYMPH % (test code = 

736-9)          13.7 %                                          

 

                                                    MONO % (test code = 

5905-5)         6.3 %                                           

 

                                                    EOS % (test code = 

713-8)          2.1 %                                           

 

                                                    BASO % (test code = 

706-2)          1.2 %                                           

 

                                                    GRAN MAT x10^3(ANC) 

(test code = 

7263373413)     5.92 10*3/uL    1.99-6.95                       

 

                                                    IMM GRAN x10^3 (test 

code = 6443555776) 0.03 10*3/uL    0-0.06                          

 

                                                    LYMPH x10^3 (test code 

= 731-0)        1.06 10*3/uL    1.09-3.23       L               

 

                                                    MONO x10^3 (test code 

= 742-7)        0.49 10*3/uL    0.36-1.02                       

 

                                                    EOS x10^3 (test code = 

711-2)          0.16 10*3/uL    0.06-0.53                       

 

                                                    BASO x10^3 (test code 

= 704-7)        0.09 10*3/uL    0.01-0.09                       

 

                                                    Lab Interpretation 

(test code = 30306-5) Abnormal                                        





[facility] 19:11:00* 



                      Test Item  Value      Reference Range Interpretation Comme

nts

 

                                                    POCT Glu (age>30days) (test 

code = 

3342)           HI                                        

 

                                                    Lab Interpretation (test cod

e = 

21085-7)        Normal                                          





[facility] [id number] 17:37:17
HISTORY: Back pain. COMPARISON: Prior study of 10/7/2016. TECHNIQUE: AP x2, 
lateral, swimmer's x2 views are submitted of the thoracicspines. FINDINGS: No 
compression fracture or aggressive bone lesions detected.Osteophytes are seen 
along the ventral and lateral vertebral margins atlower thoracic levels. No 
paravertebral soft tissue swelling. Minimal midthoracic dextroscoliosis noted. 
CONCLUSIONS:Mild degenerative spondylosis of lower thoracic spines,essentially 
unchanged when compared with 10/6/2016 study. [facility], Radiant Results Inft User - 
2019 12:39 PM CDTHISTORY: Back pain.COMPARISON: Prior study of 
10/7/2016.TECHNIQUE: AP x2, lateral, swimmer's x2 views are submitted of the 
thoracicspines.FINDINGS: No compression fracture or aggressive bone lesions 
detected.Osteophytes are seen along the ventral and lateral vertebral margins 
atlower thoracic levels. No paravertebral soft tissue swelling. Minimal 
midthoracic dextroscoliosis noted.CONCLUSIONS: Mild degenerative spondylosis of 
lower thoracic spines,essentially unchanged when compared with 10/6/2016 study.
[facility]



Notes





                          Date/Time    Note         Provider     Source

 

                                        2021-11-10 06:24:00 

[facility] ([facility]

Cardiology Progress Note

REPORT#:[id number] REPORT STATUS: Signed

DATE:11/10/21 TIME: 624



PATIENT: YULIANA MOORE UNIT #: [id number]

ACCOUNT#: [id number] ROOM/BED: [address]

: 62 AGE: 60 SEX: M ATTEND: 

Edmund Jeffrey MD

ADM DT: 21 AUTHOR: Edmund Jeffrey MD



* ALL edits or amendments must be made on the 

electronic/computer document *





Subjective

Chief Complaint:

CAD

Patient reports:

No: chest pain, palpitations, shortness of 

breath.



Objective



General

VS/I O:

24 hour I O ending at 0700:

11/10 0700  1900

Intake Total 300

Output Total

Balance 300



Intake, Oral 300

Number Voids 1



Vital Signs:

Date Time Temp Pulse Resp B/P B/P Pulse O2 O2 

Flow FiO2

Mean Ox Delivery Rate

11/10 0527 97.7 60 19 104/69 80.4 93

11/10 0031 97.9 59 19 123/74 90.4 95

 1846 98.1 67 19 128/75 92.4 93

 1620 97.7 65 18 121/70 87.2 96 Room air

 1156 97.7 60 17 123/87 99.1 98 Room air



PATIENT WEIGHT:



Weight (lb):

Weight (oz):

Weight (kg):



Medications:

Active Meds + DC'd Last 24 Hrs

Atorvastatin Calcium (LIPITOR) 10 MG BEDTIME PO

Aspirin (CHILDREN'S ASPIRIN) 81 MG DAILY PO

Carvedilol (COREG) 25 MG DAILY PO

Clopidogrel Bisulfate (PLAVIX) 75 MG DAILY PO

Glyburide (MICRONASE, DIABETA) 5 MG DAILY PO

Pioglitazone HCl (ACTOS) 15 MG DAILY PO

Tamsulosin HCl (FLOMAX) 0.4 MG DAILY PO

Gabapentin (NEURONTIN) 600 MG BID PO

Insulin Human Lispro (HumaLOG) LOW DOSE SLIDING 

SCALE

AC HS SUBQ

Nicotine (NICODERM) 21 MG DAILY TRANSDERM (CKD)

Dextrose/Water (DEXTROSE 50% IN WATER) 12.5 GM 

ASDIR PRN IV

Dextrose/Water (DEXTROSE 50% IN WATER) 25 GM 

ASDIR PRN IV

Nitroglycerin (NITROSTAT) 0.4 MG Q5M PRN PRN SL

Hydrocodone Bitart/Acetaminophen (NORCO 5/325 

TABLET (C-II)) 0 .STK-MED ONE

.ROUTE (DC)

Hydrocodone Bitart/Acetaminophen (NORCO 5/325 

TABLET (C-II)) 1 TAB NOW ONE

PO (DC)

Sodium Chloride (SODIUM CHLORIDE 0.9%) 1,000 ML 

ONCE ONE IV (CAN)

Magnesium Sulfate (MAG SULFATE 2GM PREMIX) 50 ML 

.STK-MED ONE IV (DC)

Fentanyl Citrate (SUBLIMAZE (C-II)) 0 .STK-MED 

ONE .ROUTE (DC)

Heparin Sodium (HEPARIN SODIUM) 0 .STK-MED ONE  

.ROUTE (DC)

Heparin Sodium/Sodium Chloride (HEPARIN 1000 

UNITS/NS 500ML) 1,000 ML .STK-

MED ONE IV (DC)

Iopamidol (ISOVUE-300) 0 .STK-MED ONE .ROUTE (DC)

Lidocaine (XYLOCAINE 1%) 0 .STK-MED ONE .ROUTE 

(DC)

Midazolam HCl (VERSED (C-IV)) 0 .STK-MED ONE 

.ROUTE (DC)

Sodium Chloride (SODIUM CHLORIDE 0.9%) 1,000 ML 

Q13H IV







Physical Exam

General appearance: alert, awake, oriented

Head/Eyes: atraumatic, normocephalic

ENT: moist mucosal membranes

Neck: no JVD

Cardiovascular:

CV assessment: regular rate and rhythm

Respiratory: clear to auscultation, no distress

Lower extremity:

LE assessment: no edema

Musculoskeletal: full range of motion

Neuro/CNS: alert, oriented X 3, CN II-XII intact

Skin: dry, intact

Psychiatry: normal affect, normal 

judgment/insight, normal mood



Results

Findings/Data:

Laboratory Tests

11/10 11/09 11/09 11/09 11/09

0458 1958 1617 1158 0657

Chemistry

Sodium (137 - 145 MMOL/L) 136 L 140

Potassium (3.5 - 5.1 MMOL/L) 4.1 4.1

Chloride (98 - 107 MMOL/L) 96 L  95 L

Carbon Dioxide (22 - 30 MMOL/L) 31 H 34 H

Anion Gap (14 - 24 MMOL/L) 13 L

BUN (9 - 20 MG/DL) 26 H  27 H

Creatinine (0.66 - 1.25 MG/DL) 1.00 1.10

Glomerular Filtr Rate > 60 > 60

Glucose (74 - 106 MG/DL) 213 H 254 H

POC Glucose (60 - 99 MG/DL) 302 *H 346 *H 265 H

Calcium (8.4 - 10.2 MG/DL) 9.5  9.7

Magnesium (1.6 - 2.3 MG/DL) 1.3 L



Laboratory Tests



0657

Coagulation

INR (0.86 - 1.14) 1.0

APTT (25.1 - 36.5 SECONDS) 29.6

PT Patient/Control Mix (9.5 - 12.7 SECONDS) 11.2



Laboratory Tests

11/10 11/09

0458 0657

Hematology

WBC (3.8 - 9.8 K/MM3) 6.2 6.7

RBC (3.95 - 5.67 M/MM3) 4.06 4.34

Hgb (12.4 - 16.7 G/DL) 11.6 L 12.5

Hct (35.9 - 49.5 %)  36.8 40.0

MCV (81.7 - 96.1 fL) 91 92

MCH (27.6 - 33.2 pg) 28.6 28.8

MCHC (32.9 - 35.5 %) 31.5 L 31.3 L

RDW (12.1 - 15.2 %) 14.2 14.1

Plt Count (129 - 368 K/MM3) 190 219

MPV (7.4 - 10.4 fl) 10.9 H 10.8 H

Neut % (Auto) (43 - 75 %) 63.4 67.7

Lymph % (Auto) (14 - 44 %)  22.1 19.6

Mono % (Auto) (4 - 13 %) 9.0 7.8

Eos % (Auto) (0 - 6 %) 4.5 3.9

Baso % (Auto) (0 - 2 %) 0.8 0.7

Neut # (Auto) (2.0 - 7.6 K/mm3) 3.96 4.54

Lymph # (Auto) (1.0 - 3.8 K/mm3) 1.38 1.31

Mono # (Auto) (0.1 - 0.8 K/mm3) 0.56 0.52

Eos # (Auto) (0.0 - 0.2 K/mm3) 0.28 H 0.26 H

Baso # (Auto) (0.0 - 0.2 K/mm3)  0.05 0.05

Immature Gran % (0.0 - 2.0 %) 0.2 0.3

Nucleated RBC % (0 - 1.0 %) 0.0 0.0

Nucleated RBCs # (Man) (0.0 - 0.1 K/mm3) 0.00 

0.00



Laboratory Tests

657

Chemistry

Magnesium (1.6 - 2.3 MG/DL) 1.3 L



Laboratory Tests

657

Coagulation

APTT (25.1 - 36.5 SECONDS) 29.6









Diagnosis, Assessment Plan



Free Text DxA P Notes

Free Text DxA P Notes:

IMP: CAD

s/p LHC - patent LAD stent

DM



PLAN: d/c home

f/u 3 months



Electronically Signed by Edmund Jeffrey MD on 

21 at 0817



[facility] #:[id number]

***END OF REPORT***                                 [facility]

 

                                        2021 08:35:00 [id number] [address] [address]



PATIENT NAME: YULIANA MOORE ADMIT DATE: 

21

ACCOUNT NO: [id number] [facility]

MEDICAL RECORD NO: [id number] AGE: 60

REPORT TYPE: CARDIAC CATHETERIZATION REPORT SEX: 

M



ADMITTING PHYSICIAN:Edmund Jeffrey MD

ATTENDING PHYSICIAN:Edmund Jeffrey MD







PROCEDURE DATE: 2021



PROCEDURE NOTE



PROCEDURES:

1. Left heart catheterization.

2. Selective coronary angiography.

3. Left ventriculography.

4. Right femoral arteriography.

5. Angio-Seal deployment.



PREPROCEDURE DIAGNOSES:

1. Unstable angina.

2. Coronary artery disease, status post 

percutaneous transluminal coronary

angioplasty and stent.

3. Diabetes.

4. Hypertension.



POSTPROCEDURE DIAGNOSES:

1. Unstable angina.

2. Coronary artery disease, status post 

percutaneous transluminal coronary

angioplasty and stent.

3. Diabetes.

4. Hypertension.



: Edmund Jeffrey MD



ASSISTANT: None.



ANESTHESIA: Local anesthesia with 1% lidocaine 

and moderate sedation.



PROCEDURE DETAILS: After informed consent was 

obtained explaining to the

patient the risks, benefits, and alternatives, 

the patient was brought to the

cardiac catheterization lab in the fasting 

postabsorptive state. He was prepped

and draped in sterile fashion. Local anesthesia 

was applied over the right

femoral artery with 1% lidocaine. Access to right 

femoral artery was obtained

using modified Seldinger technique with a 

micropuncture kit. A 6-French sheath

was advanced over the wire into the right femoral 

artery. The sheath was

aspirated and flushed. A 6-French JL4 catheter 

was advanced over the wire and

engaged the left main coronary artery. Multiple 

views of left coronary system

were obtained. The JR4 catheter was exchanged for 

a 6-French JR4 catheter,

which was advanced over the wire and engaged the 

right coronary artery.



PATIENT NAME: YULIANA MOORE ACCOUNT #: 

[id number]







Multiple views of the right coronary system were 

obtained. The JR4 catheter was

exchanged for a 6-French pigtail catheter, which 

was advanced over the wire and

crossed the aortic valve. Left ventricular 

pressures were measured and

recorded. Left ventriculogram was obtained. The 

pigtail catheter was

reconnected to the pressure transducer and 

withdrawn over the aortic valve. The

aortic pressures were measured and recorded. The 

pigtail catheter was removed.

The sheath was aspirated and flushed. A right 

femoral arteriogram was obtained

via the sheath. After confirmation of adequate 

placement of the sheath, a

6-French Angio-Seal device was deployed without 

complications. The patient

tolerated the procedure well with no 

complications.



FINDINGS:

1. Left main -- no significant disease.

2. Left anterior descending, proximal mid stent.

3. Left circumflex, mild diffuse plaquing in the 

mid segment.

4. Right coronary artery -- dominant. A 40% mid 

stenosis.



LEFT VENTRICULOGRAM: Overall, ejection fraction 

is approximately 70%.



PRESSURES: Aortic pressure is 143/95. Left 

ventricular pressure is 142/34.



CONCLUSIONS:

1. Mild nonobstructive coronary artery disease.

2. Patent LAD stent.

3. Hyperdynamic left ventricular systolic 

function.



ESTIMATED BLOOD LOSS: Negligible.



COMPLICATIONS: No complications.



Dictated By: Edmund Jeffrey MD



WT: CATH:AMNA/PEPDELMER/NTS

DD: 2021 08:35:57

DT: 2021 09:02:24

Conf#: [id number]/DID#: [id number]



Authenticated and Edited by Edmund Jeffrey MD 

On 21 1:19:31 PM











Electronically Signed by Edmund Jeffrey MD on 

21 at 0121























PATIENT NAME: YULIANA MOORE ACCOUNT #: 

[id number]                                        [facility]

 

                                        2021 08:16:00 [id number] [place] [address]



PATIENT NAME: YULIANA MOORE ADMIT DATE: 

21

ACCOUNT NO: [id number] ROOM NO: 361

MEDICAL RECORD NO: [id number] AGE: 60

REPORT TYPE: ELECTROCARDIOGRAM SEX: M



ADMITTING PHYSICIAN:Edmund Jeffrey MD

ATTENDING PHYSICIAN:Edmund Jeffrey MD







Order:

[id number]

Test Reason : CAD

Test Date/Time Stamp:

 08:16:04

Blood Pressure : ***/*** mmHG

Vent. Rate : 070 BPM Atrial Rate : 070 BPM

P-R Int : 158 ms QRS Dur : 094 ms

QT Int : 396 ms P-R-T Axes : -18 050 053 degrees

QTc Int : 427 ms



Normal sinus rhythm

Normal ECG

When compared with ECG of 15-OCT-2017 11:19,

T wave amplitude has decreased in Inferior leads

QT has lengthened

Confirmed by MD POLLO, LI ARVIZU ([id number]) on 

11/10/2021 7:58:08 AM



Referred By: Self Referred Confirmed by:LI DAMIAN MD











Electronically Signed by Li Damian MD on 

11/10/21 at 0758



































PATIENT NAME: YULIANA MOORE ACCOUNT #: 

[id number]                                        [facility]

 

                                        2021-10-05 10:41:00 

[facility] ([facility]

Clinical Note

REPORT#:[id number] REPORT STATUS: Signed

DATE:10/05/21 TIME: 1041



PATIENT: YULIANA MOORE UNIT #: [id number]

ACCOUNT#: [id number] ROOM/BED:

: 61 AGE: 60 SEX: M ATTEND: 

Manuel Tamayo [facility] DT: 10/05/21 AUTHOR: Marlena Alexandra NP



* ALL edits or amendments must be made on the 

electronic/computer document *





Clinical Note

Note:

Name:Yuliana Moore

:1960

Ht: 6.0 ft Wt: 289 lbs

Date: 10/05/2021 Time:10:00am



Shared-Decision Making Visit

- Reason for Visit: Lung cancer screening 

counseling and shared decision making

visit.

- Chief Complaint: The patient has a significant 

smoking history and is

interested in learning more about screening.

-Mr. Moore reports a 2-3 ppd for 43 years history 

of smoking cigarettes. The

patient is still an active smoker. The patient 

willingly participated in the

consultation today for lung cancer screening and 

was open to receiving material

on smoking cessation.



The patient participated in a shared decision 

making session during which

potential risks and benefits of CT lung screening 

were discussed.

The patient was informed of the importance of 

adherence to annual screening,

impact of comorbidities and ability/willingness 

to undergo diagnosis and

treatment.

The patient was informed of the importance of 

smoking cessation and/or

maintaining smoking abstinence, including the 

offer of Medicare- covered tobacco

cessation services, if applicable.

The patient is asymptomatic (no symptoms such as 

fever, chest pain, new

shortness of breath, new or changing cough, 

coughing up blood or unexplained

weight loss.



Electronically Signed by Marlena Alexandra NP on 

10/05/21 at 1044

Electronically Signed by Taryn Barrientos MD on 

10/05/21 at 2418



[facility] #:[phone]

***END OF REPORT***                                 [facility]

## 2024-09-11 NOTE — EDPHYS
Physician Documentation                                                                           

 [facility]                                                                 

Name: Yuliana Moore                                                                                

Age: 63 yrs                                                                                       

Sex: Male                                                                                         

: 1960                                                                                   

MRN: [id number]                                                                                   

Arrival Date: 2024                                                                          

Time: 08:42                                                                                       

Account#: [id number]                                                                            

Bed 2                                                                                             

Private MD:                                                                                       

ED Physician Cole Booker                                                                      

HPI:                                                                                              

                                                                                             

09:31 This 63 yrs old  Male presents to ER via Ambulatory with complaints of head    evan 

      wound re-opened.                                                                            

09:31 The patient or guardian reports injury, swelling. The complaints affect the forehead.   evan 

      Context of injury: The problem was sustained at home, resulted from skin lesion             

      removed. Onset: The symptoms/episode began/occurred 2 day(s) ago. Associated signs and      

      symptoms: Loss of consciousness: This patient did not experience any loss of                

      consciousness. Severity of symptoms: At their worst the symptoms were mild, in the          

      emergency department the symptoms have improved, mildly. The patient has experienced        

      similar episodes in the past, a few times.                                                  

                                                                                                  

Historical:                                                                                       

- Allergies:                                                                                      

08:50 No Known Allergies;                                                                     ll1 

- PMHx:                                                                                           

08:50 Arthritis; CAD; COPD; Diabetes - IDDM; High Cholesterol; Hypertension;                  ll1 

- PSHx:                                                                                           

08:50 eye; knee; skin tag; Stented artery;                                                    ll1 

                                                                                                  

- Immunization history:: Adult Immunizations up to date.                                          

- Infectious Disease History:: Denies.                                                            

- Social history:: Smoking status: Patient reports the use of cigarette tobacco                   

  products, smokes two packs cigarettes per day.                                                  

- Family history:: not pertinent.                                                                 

                                                                                                  

                                                                                                  

ROS:                                                                                              

09:31 Constitutional: Negative for fever, chills, and weight loss, Eyes: Negative for injury, evan 

      pain, redness, and discharge, ENT: Negative for injury, pain, and discharge, Neck:          

      Negative for injury, pain, and swelling, Cardiovascular: Negative for chest pain,           

      palpitations, and edema, Respiratory: Negative for shortness of breath, cough,              

      wheezing, and pleuritic chest pain, Abdomen/GI: Negative for abdominal pain, nausea,        

      vomiting, diarrhea, and constipation, Back: Negative for injury and pain, : Negative      

      for injury, bleeding, discharge, and swelling, MS/Extremity: Negative for injury and        

      deformity, Neuro: Negative for headache, weakness, numbness, tingling, and seizure,         

      Psych: Negative for depression, anxiety, suicide ideation, homicidal ideation, and          

      hallucinations, Allergy/Immunology: Negative for hives, rash, and allergies, Endocrine:     

      Negative for neck swelling, polydipsia, polyuria, polyphagia, and marked weight             

      changes, Hematologic/Lymphatic: Negative for swollen nodes, abnormal bleeding, and          

      unusual bruising,                                                                           

09:31 Skin: Positive for bleeding at biopsy site,                                                 

                                                                                                  

Exam:                                                                                             

:31 Constitutional:  This is a well developed, well nourished patient who is awake, alert,  evan 

      and in no acute distress. Eyes:  Pupils equal round and reactive to light, extra-ocular     

      motions intact.  Lids and lashes normal.  Conjunctiva and sclera are non-icteric and        

      not injected.  Cornea within normal limits.  Periorbital areas with no swelling,            

      redness, or edema. ENT:  Nares patent. No nasal discharge, no septal abnormalities          

      noted.  Tympanic membranes are normal and external auditory canals are clear.               

      Oropharynx with no redness, swelling, or masses, exudates, or evidence of obstruction,      

      uvula midline.  Mucous membranes moist. Neck:  Trachea midline, no thyromegaly or           

      masses palpated, and no cervical lymphadenopathy.  Supple, full range of motion without     

      nuchal rigidity, or vertebral point tenderness.  No Meningismus. Chest/axilla:  Normal      

      chest wall appearance and motion.  Nontender with no deformity.  No lesions are             

      appreciated. Cardiovascular:  Regular rate and rhythm with a normal S1 and S2.  No          

      gallops, murmurs, or rubs.  Normal PMI, no JVD.  No pulse deficits. Respiratory:  Lungs     

      have equal breath sounds bilaterally, clear to auscultation and percussion.  No rales,      

      rhonchi or wheezes noted.  No increased work of breathing, no retractions or nasal          

      flaring. Abdomen/GI:  Soft, non-tender, with normal bowel sounds.  No distension or         

      tympany.  No guarding or rebound.  No evidence of tenderness throughout. Back:  No          

      spinal tenderness.  No costovertebral tenderness.  Full range of motion. Male :           

      Normal genitalia with no discharge or lesions. MS/ Extremity:  Pulses equal, no             

      cyanosis.  Neurovascular intact.  Full, normal range of motion. Neuro:  Awake and           

      alert, GCS 15, oriented to person, place, time, and situation.  Cranial nerves II-XII       

      grossly intact.  Motor strength 5/5 in all extremities.  Sensory grossly intact.            

      Cerebellar exam normal.  Normal gait. Psych:  Awake, alert, with orientation to person,     

      place and time.  Behavior, mood, and affect are within normal limits.                       

09:31 Head/face: Noted is bleeding on forehead site, excision.                                    

                                                                                                  

Vital Signs:                                                                                      

08:50 Pulse 72; Resp 18; Temp 98; Pulse Ox 100% ; Weight 124.74 kg; Height 6 ft. 0 in. ; Pain ll1 

      0/10;                                                                                       

09:34  / 77; Pulse 60; Resp 16; Pulse Ox 95% ;                                          bp  

08:50 Body Mass Index 37.30 (124.74 kg, 182.88 cm)                                            ll1 

08:50 Pain Scale: Adult                                                                       ll1 

                                                                                                  

Hamer Coma Score:                                                                               

09:31 Eye Response: spontaneous(4). Motor Response: obeys commands(6). Verbal Response:       evan 

      oriented(5). Total: 15.                                                                     

09:40 Eye Response: spontaneous(4). Motor Response: obeys commands(6). Verbal Response:       evan 

      oriented(5). Total: 15.                                                                     

                                                                                                  

MDM:                                                                                              

09:10 Patient medically screened.                                                             evan 

09:40 Data reviewed: vital signs, nurses notes, lab test result(s). Consideration of          evan 

      Admission/Observation Escalation of care including admission/observation considered. I      

      considered the following discharge prescriptions or medication management in the            

      emergency department Medications were administered in the Emergency Department. See         

      MAR. Test considered but Not performed: Labs: no labs. Care significantly affected by       

      the following chronic conditions: Diabetes, Hypertension, Chronic Obstructive Pulmonary     

      Disease, Obesity, oa.                                                                       

                                                                                                  

                                                                                             

09:31 Order name: Wound Care: wet-dry; Complete Time: 09:33                                   evan 

                                                                                                  

Administered Medications:                                                                         

No medications were administered                                                                  

                                                                                                  

                                                                                                  

Disposition Summary:                                                                              

24 09:43                                                                                    

Discharge Ordered                                                                                 

 Notes:       Location: Home                                                                        
  evan

      Problem: new                                                                            evan 

      Symptoms: have improved                                                                 evan 

      Condition: Stable                                                                       evan 

      Diagnosis                                                                                   

        - Postprocedural hemorrhage of skin and subcutaneous tissue following other procedure evan 

      Followup:                                                                               evan 

        - With: Private Physician                                                                  

        - When: Upon discharge from the Emergency Department                                       

        - Reason: Recheck today's complaints, Continuance of care, Re-evaluation by your           

      physician                                                                                   

      Discharge Instructions:                                                                     

        - Discharge Summary Sheet                                                             evan 

        - Excision of Skin Lesions                                                            evan 

        - Excision of Skin Lesions, Care After                                                evan 

      Forms:                                                                                      

        - Medication Reconciliation Form                                                      evan 

        - Antibiotic Education                                                                evan 

        - Prescription Opioid Use                                                             evan 

        - Patient Portal Instructions                                                         evan 

        - Leadership Thank You Letter                                                         evan 

Signatures:                                                                                       

Cole Booker MD MD cha Lewis, Lynsay RN                       RN   ll1                                                  

                                                                                                  

**************************************************************************************************

## 2024-09-11 NOTE — ER
Nurse's Notes                                                                                     

 [facility]                                                                 

Name: Yuliana Moore                                                                                

Age: 63 yrs                                                                                       

Sex: Male                                                                                         

: 1960                                                                                   

MRN: [id number]                                                                                   

Arrival Date: 2024                                                                          

Time: 08:42                                                                                       

Account#: [id number]                                                                            

Bed 2                                                                                             

Private MD:                                                                                       

Diagnosis: Postprocedural hemorrhage of skin and subcutaneous tissue following other procedure    

                                                                                                  

Presentation:                                                                                     

                                                                                             

08:50 Chief complaint: Patient states: Had skin CA removed from L side of head. Started       ll1 

      bleeding last night and today after scratching site. No fever. Coronavirus screen:          

      Client denies travel out of the [place] in the last 14 days. At this time, the client does     

      not indicate any symptoms associated with coronavirus-19. Ebola Screen: Patient denies      

      travel to an Ebola-affected area in the 21 days before illness onset. Initial Sepsis        

      Screen: Does the patient meet any 2 criteria? No. Patient's initial sepsis screen is        

      negative. Does the patient have a suspected source of infection? No. Patient's initial      

      sepsis screen is negative. Risk Assessment: Do you want to hurt yourself or someone         

      else? Patient reports no desire to harm self or others. Onset of symptoms was September     

      10, 2024.                                                                                   

08:50 Method Of Arrival: Ambulatory                                                           ll1 

08:50 Acuity: TATIANA 3                                                                           ll1 

                                                                                                  

Historical:                                                                                       

- Allergies:                                                                                      

08:50 No Known Allergies;                                                                     ll1 

- PMHx:                                                                                           

08:50 Arthritis; CAD; COPD; Diabetes - IDDM; High Cholesterol; Hypertension;                  ll1 

- PSHx:                                                                                           

08:50 eye; knee; skin tag; Stented artery;                                                    ll1 

                                                                                                  

- Immunization history:: Adult Immunizations up to date.                                          

- Infectious Disease History:: Denies.                                                            

- Social history:: Smoking status: Patient reports the use of cigarette tobacco                   

  products, smokes two packs cigarettes per day.                                                  

- Family history:: not pertinent.                                                                 

                                                                                                  

                                                                                                  

Screenin:25 [facility] ED Fall Risk Assessment (Adult) History of falling in the last 3 months,       iw  

      including since admission No falls in past 3 months (0 pts) Confusion or Disorientation     

      No (0 pts) Intoxicated or Sedated No (0 pts) Impaired Gait No (0 pts) Mobility Assist       

      Device Used No (0 pt) Altered Elimination No (0 pt) Score/Fall Risk Level 0 - 2 = Low       

      Risk. Abuse screen: Denies injuries from another. Nutritional screening: No deficits        

      noted. Tuberculosis screening: No symptoms or risk factors identified.                      

                                                                                                  

Assessment:                                                                                       

09:24 General: Appears in no apparent distress. Behavior is calm, cooperative. Pain: Denies   iw  

      pain. Neuro: Level of Consciousness is awake, alert, obeys commands, Oriented to            

      person, place, time, situation, Moves all extremities. Full function. Cardiovascular:       

      Patient's skin is warm and dry. Respiratory: Airway is patent Respiratory effort is         

      even, unlabored. GI: No signs and/or symptoms were reported involving the                   

      gastrointestinal system. Derm: circular area of skin removed from left forehead are,        

      blood clot noted to wound, bleeding has stopped.                                            

                                                                                                  

Vital Signs:                                                                                      

08:50 Pulse 72; Resp 18; Temp 98; Pulse Ox 100% ; Weight 124.74 kg; Height 6 ft. 0 in. ; Pain ll1 

      0/10;                                                                                       

09:34  / 77; Pulse 60; Resp 16; Pulse Ox 95% ;                                          bp  

08:50 Body Mass Index 37.30 (124.74 kg, 182.88 cm)                                            ll1 

08:50 Pain Scale: Adult                                                                       ll1 

                                                                                                  

Terrie Coma Score:                                                                               

09:31 Eye Response: spontaneous(4). Motor Response: obeys commands(6). Verbal Response:       evan 

      oriented(5). Total: 15.                                                                     

09:40 Eye Response: spontaneous(4). Motor Response: obeys commands(6). Verbal Response:       evan 

      oriented(5). Total: 15.                                                                     

                                                                                                  

ED Course:                                                                                        

08:44 Patient arrived in ED.                                                                  ra3 

08:46 Steve Berkowitz, RN is Primary Nurse.                                                    bp  

08:49 Arm band placed on Patient placed in an exam room, on a stretcher.                      ll1 

08:52 Triage completed.                                                                       ll1 

09:10 Cole Booker MD is Attending Physician.                                             evan 

09:29 No provider procedures requiring assistance completed. Patient did not have IV access   iw  

      during this emergency room visit. Dressings: 4X4s X 1; forehead.                            

                                                                                                  

Administered Medications:                                                                         

No medications were administered                                                                  

                                                                                                  

                                                                                                  

Medication:                                                                                       

09:25 VIS not applicable for this client.                                                     iw  

                                                                                                  

Outcome:                                                                                          

09:43 Discharge ordered by MD.                                                                evan 

10:04 Patient left the ED.                                                                    iw  

                                                                                                  

Signatures:                                                                                       

Cole Booker MD MD cha Williams, Irene, RN RN   iw                                                   

Steve Berkowitz RN RN bp Lewis, Lynsay, RN                       RN   [facility]                                                  

Candy Coulter                                   ra3                                                  

                                                                                                  

**************************************************************************************************

## 2025-01-01 NOTE — XMS REPORT
Continuity of Care Document

                           Created on:2023



Patient:SALEEM MOORE

Sex:Male

:1960

External Reference #:[id number]





Demographics







                          Address                    [address]



                                                    [place] [address]

 

                          Home Phone                [phone]

 

                          Work Phone                [phone]

 

                          Mobile Phone              [phone]

 

                          Email Address             [email or web address]

 

                          Preferred Language        English

 

                          Marital Status            Unknown

 

                          Islam Affiliation     Unknown

 

                          Race                      Unknown

 

                          Additional Race(s)        Unavailable



                                                    White

 

                          Ethnic Group              Not  or 









Author







                          Organization              [facility]

t

 

                          Address                   [address]



                                                    [place] [address]

 

                          Phone                     [phone]









Support







                Name            Relationship    Address         Phone

 

                ALEAH MOORE     FA               [phone] 



                                                [place] [address] 

 

                ALEAH MOORE     Unavailable      [phone]





                                                [place] [address] 

 

                HANNAH GAGNON    Unavailable     .               [phone]

 

                HANNAH SMITH  SI              [address]          [phone]



                                                [place] [address] 

 

                Aleah Moore     Father          [address] [phone]



                                                [place] [address] 









Care Team Providers







                    Name                Role                Phone

 

                    Pankaj Lloyd    Primary Care Physician [phone]

 

                    Manuel Tamayo      Attending Clinician Unavailable

 

                    , [facility] Lab Bed Attending Clinician Unavailable

 

                    Sly Oconnor MD Attending Clinician [phone]

 

                    SLY OCONNOR Attending Clinician Unavailable

 

                    SLY OCONNOR Attending Clinician Unavailable

 

                    Doctor Unassigned, No Name Attending Clinician Unavailable

 

                    Vasyl Lloyd      Attending Clinician Unavailable

 

                    Edmund Jeffrey   Attending Clinician Unavailable

 

                    MANUEL TAMAYO      Attending Clinician Unavailable

 

                    MARK YEE      Attending Clinician Unavailable

 

                    Zachary Solis RN Attending Clinician Unavailable

 

                    ADDY PEREZ           Attending Clinician Unavailable

 

                    Solange Chavez Attending Clinician [phone]

 

                    Addy Perez MD        Attending Clinician [phone]

 

                    Radiology           Attending Clinician Unavailable

 

                    Manuel Tamayo      Admitting Clinician Unavailable

 

                    Edmund Jeffrey   Admitting Clinician Unavailable

 

                    MANUEL TAMAYO      Admitting Clinician Unavailable

 

                    Anuradha Dash    Admitting Clinician Unavailable

 

                    MARK YEE      Admitting Clinician Unavailable

 

                    ADDY PEREZ           Admitting Clinician Unavailable

 

                    Addy Perez MD        Admitting Clinician [phone]









Payers







           Payer Name Policy Type Policy Number Effective Date Expiration Date S

ource

 

           HIM AMBETTER FROM            [id number] 2020            



           [facility]                       00:00:00              







Problems







       Condition Condition Condition Status Onset  Resolution Last   Treating Co

mments 

Source



       Name   Details Category        Date   Date   Treatment Clinician        



                                                 Date                 

 

       Coronary Coronary Disease Active                              Unive

rs



       artery artery                                              ity of



       disease disease               00:00:                             [place]



       involving involving               00                                 Medi

tess



       native native                                                  Branch



       coronary coronary                                                  



       artery of artery of                                                  



       native native                                                  



       heart with heart with                                                  



       angina angina                                                  



       pectoris pectoris                                                  

 

       Acute on Acute on Disease Active                              Unive

rs



       chronic chronic                                              ity of



       diastolic diastolic               00:00:                             [facility]

s



       congestive congestive               00                                 Me

dical



       heart  heart                                                   Branch



       failure failure                                                  

 

       Essential Essential Disease Active                              Uni

vers



       hypertensi hypertensi                                              it

y of



       on     on                   00:00:                             [place]



                                   00                                 Medical



                                                                      Branch

 

       Other  Other  Disease Active                              Univers



       hyperlipid hyperlipid                                              it

y of



       emia   emia                 00:00:                             [place]



                                                                    Medical



                                                                      Branch

 

       Type 2 Type 2 Disease Active                              Univers



       diabetes diabetes                                              ity of



       mellitus mellitus               00:00:                             [place]



       without without               00                                 Medical



       complicati complicati                                                  Br

anch



       on,    on,                                                     



       without without                                                  



       long-term long-term                                                  



       current current                                                  



       use of use of                                                  



       insulin insulin                                                  

 

       BROOKE    BROOKE    Disease Active                              Univers



       (obstructi (obstructi                                              it

y of



       ve sleep ve sleep               00:00:                             [place]



       apnea) apnea)               00                                 Medical



                                                                      Branch

 

       Acute  Acute  Disease Active                              Univers



       kidney kidney               3                               ity of



       injury injury               00:00:                             [place]



       (LAURA) with (LAURA) with               00                                 Me

dical



       acute  acute                                                   Branch



       tubular tubular                                                  



       necrosis necrosis                                                  



       (ATN)  (ATN)                                                   

 

       Hyperglyce Hyperglyce Disease Active                              U

nivers



       cortney    cortney                  2-29                               ity of



                                   00:00:                             [place]



                                                                    Medical



                                                                      Branch

 

       Chest pain Chest pain Disease Active 2018-                             U

nivers



                                   9-10                               ity of



                                   00:00:                             [place]



                                   00                                 Medical



                                                                      Branch

 

       Other  Other  Disease Active 2017                             Univers



       chest pain chest pain               0-08                               it

y of



                                   00:00:                             [place]



                                   00                                 Medical



                                                                      Branch

 

       Unstable Unstable Disease Active 2017                             Unive

rs



       angina angina               0-07                               ity of



                                   00:00:                             [place]



                                                                    Medical



                                                                      Branch

 

       Obesity Obesity Disease Active 2017                             Univers



       (BMI   (BMI                 0-07                               ity of



       30-39.9) 30-39.9)               00:00:                             [place]



                                                                    Medical



                                                                      Branch

 

       Knee pain, Knee pain, Disease Active                              U

nivers



       left   left                 5-10                               ity of



                                   00:00:                             [place]



                                                                    Medical



                                                                      Branch

 

       Knee pain, Knee pain, Disease Active                              U

nivers



       left   left                 5-10                               ity of



                                   00:00:                             [address]







Allergies, Adverse Reactions, Alerts







       Allergy Allergy Status Severity Reaction(s) Onset  Inactive Treating Comm

ents 

Source



       Name   Type                        Date   Date   Clinician        

 

       No Known DA     Active U                                   [facility]



       Allergie                             9-16                        [place]



       s                                  00:00:                      [address]

 

       No Known DA     Active U             2017                      [facility]



       Allergie                             0-14                        [place]



       s                                  00:00:                      [address]

 

       NO KNOWN Drug   Active                                           Univers



       ALLERGIE Class                                                   ity of



       S                                                              [facility]







Social History







           Social Habit Start Date Stop Date  Quantity   Comments   Source

 

           History of tobacco                       Cigarette Smoker            

University of



           use                                                    [facility]

 

           Exposure to 2023 Not sure              [facility]



           SARS-CoV-2 (event) 00:00:00   02:23:00                         [facility]

 

           Alcohol intake 2020 Current               University

 of



                      00:00:00   00:00:00   non-drinker of            [place] Medi

tess



                                            alcohol               Branch



                                            (finding)             

 

           Tobacco use and 2017-10-07 2017-10-07 Smokeless             Universit

y of



           exposure   00:00:00   00:00:00   tobacco non-user            [place] Me

dical



                                                                  Branch

 

           Cigarettes smoked 2017-10-07 2017-10-07                       Univers

ity of



           current (pack per 00:00:00   00:00:00                         [place] M

edical



           day) - Reported                                             Branch

 

           Cigarette  2017-10-07 2017-10-07                       University of



           pack-years 00:00:00   00:00:00                         [facility]

 

           Alcohol Comment 2009-12-15 2009-12-15 quit drinking            Univer

sity of



                      00:00:00   00:00:00                         [facility]

 

           Sex Assigned At 1960                       Universit

y of



           Birth      00:00:00   00:00:00                         [facility]









                Smoking Status  Start Date      Stop Date       Source

 

                Smokes tobacco daily 2017-10-07 00:00:00                 [facility]

ity of [facility]







Medications







       Ordered Filled Start  Stop   Current Ordering Indication Dosage Frequency

 Signature

                    Comments            Components          Source



     Medication Medication Date Date Medication? Clinician                (SIG) 

          



     Name Name                                                   

 

     aspirin 81            Yes       [id number] 81mg      Take 1           U

nivers



     mg chewable      6-02                               tablet by           ity

 of



     tablet      00:00:                               mouth           [place]



               00                                 daily.           Medical



                                                                 Branch

 

     aspirin 81      2020-0      Yes       [id number] 81mg      Take 1           U

nivers



     mg chewable      6-02                               tablet by           ity

 of



     tablet      00:00:                               mouth           [place]



               00                                 daily.           Medical



                                                                 Branch

 

     aspirin 81      2020-0      Yes       [id number] 81mg      Take 1           U

nivers



     mg chewable      6-02                               tablet by           ity

 of



     tablet      00:00:                               mouth           [place]



               00                                 daily.           Medical



                                                                 Branch

 

     tamsulosin      2020-0      Yes            .4mg      Take 0.4           Uni

vers



     0.4 mg 24      6-01                               mg by           ity of



     hr capsule      14:28:                               mouth           [place]



               29                                 daily.           Medical



                                                                 Branch

 

     metFORMIN      2020-0      Yes            500mg      Take 500           Uni

vers



     500 mg      6-01                               mg by           ity of



     tablet      14:28:                               mouth 2           [place]



               29                                 (two)           Medical



                                                  times           Branch



                                                  daily with           



                                                  meals.           

 

     insulin      2020-0      Yes                      inject           Univers



     aspart      6-01                               under the           ity of



     prot/insuln      14:28:                               skin 2           [place]

s



     asp       29                                 (two)           Medical



     (NOVOLOG                                         times           Branch



     MIX                                          daily with           



     70-30FLEXPE                                         meals.           



     N U-100 SC)                                                        

 

     tamsulosin      2020-0      Yes            .4mg      Take 0.4           Uni

vers



     0.4 mg 24      6-01                               mg by           ity of



     hr capsule      14:28:                               mouth           [place]



               29                                 daily.           Medical



                                                                 Branch

 

     metFORMIN      2020-0      Yes            500mg      Take 500           Uni

vers



     500 mg      6-01                               mg by           ity of



     tablet      14:28:                               mouth 2           [place]



               29                                 (two)           Medical



                                                  times           Branch



                                                  daily with           



                                                  meals.           

 

     insulin      2020-0      Yes                      inject           Univers



     aspart      6-01                               under the           ity of



     prot/insuln      14:28:                               skin 2           [place]

s



     asp       29                                 (two)           Medical



     (NOVOLOG                                         times           Branch



     MIX                                          daily with           



     70-30FLEXPE                                         meals.           



     N U-100 SC)                                                        

 

     tamsulosin      2020-0      Yes            .4mg      Take 0.4           Uni

vers



     0.4 mg 24      6-01                               mg by           ity of



     hr capsule      14:28:                               mouth           [place]



               29                                 daily.           Medical



                                                                 Branch

 

     metFORMIN      2020-0      Yes            500mg      Take 500           Uni

vers



     500 mg      6-01                               mg by           ity of



     tablet      14:28:                               mouth 2           [place]



               29                                 (two)           Medical



                                                  times           Branch



                                                  daily with           



                                                  meals.           

 

     insulin      2020-0      Yes                      inject           Univers



     aspart      6-01                               under the           ity of



     prot/insuln      14:28:                               skin 2           [place]

s



     asp       29                                 (two)           Medical



     (NOVOLOG                                         times           Branch



     MIX                                          daily with           



     70-30FLEXPE                                         meals.           



     N U-100 SC)                                                        

 

     benazepril-      2020-0      Yes       [id number] 1{tbl}      Take 1         

  Univers



     hydrochlort      6-01                               tablet by           ity

 of



     hiazide      00:00:                               mouth           [place]



     20-25 mg      00                                 daily.           Medical



     per tablet                                                        Branch

 

     carvediloL      2020-0      Yes       [id number] 12.5mg      Take 1          

 Univers



     12.5 mg      6-01                               tablet by           ity of



     tablet      00:00:                               mouth 2           [place]



               00                                 (two)           Medical



                                                  times           Branch



                                                  daily.           

 

     isosorbide      2020-0      Yes       [id number] 60mg      Take 1           U

nivers



     mononitrate      6-01                               tablet by           ity

 of



     60 mg 24 hr      00:00:                               mouth           [place]



     tablet      00                                 daily.           Medical



                                                                 Branch

 

     benazepril-      2020-0      Yes       [id number] 1{tbl}      Take 1         

  Univers



     hydrochlort      6-01                               tablet by           ity

 of



     hiazide      00:00:                               mouth           [place]



     20-25 mg      00                                 daily.           Medical



     per tablet                                                        Branch

 

     carvediloL      2020-0      Yes       [id number] 12.5mg      Take 1          

 Univers



     12.5 mg      6-01                               tablet by           ity of



     tablet      00:00:                               mouth 2           [place]



               00                                 (two)           Medical



                                                  times           Branch



                                                  daily.           

 

     isosorbide      2020-0      Yes       [id number] 60mg      Take 1           U

nivers



     mononitrate      6-01                               tablet by           ity

 of



     60 mg 24 hr      00:00:                               mouth           [place]



     tablet      00                                 daily.           Medical



                                                                 Branch

 

     benazepril-      2020-0      Yes       [id number] 1{tbl}      Take 1         

  Univers



     hydrochlort      6-01                               tablet by           ity

 of



     hiazide      00:00:                               mouth           [place]



     20-25 mg      00                                 daily.           Medical



     per tablet                                                        Branch

 

     carvediloL      2020-0      Yes       [id number] 12.5mg      Take 1          

 Univers



     12.5 mg      6-01                               tablet by           ity of



     tablet      00:00:                               mouth 2           [place]



               00                                 (two)           Medical



                                                  times           Branch



                                                  daily.           

 

     isosorbide      2020-0      Yes       [id number] 60mg      Take 1           U

nivers



     mononitrate      6-01                               tablet by           ity

 of



     60 mg 24 hr      00:00:                               mouth           [place]



     tablet      00                                 daily.           Medical



                                                                 Branch

 

     tamsulosin      2020-0      Yes            .4mg      Take 0.4           Uni

vers



     0.4 mg 24      3-02                               mg by           ity of



     hr capsule      18:15:                               mouth           [place]



               44                                 daily.           Medical



                                                                 Branch

 

     tamsulosin      2020-0      Yes            .4mg      Take 0.4           Uni

vers



     0.4 mg 24      3-02                               mg by           ity of



     hr capsule      18:15:                               mouth           [place]



               44                                 daily.           Medical



                                                                 Branch

 

     metFORMIN      2020-0      Yes            500mg      500 mg,           Univ

ers



     (GLUCOPHAGE      3-                               Oral, BID           ity

 of



     ) tablet      14:00:                               MEALS,           [place]



     500 mg      00                                 First dose           Medical



                                                  on Mon           Branch



                                                  3/2/20 at           



                                                  0800,           



                                                  Until           



                                                  Discontinu           



                                                  ed,            



                                                  Routine           

 

     glyBURIDE 5      2020-0 2020- No        [id number] 5mg       Take 1          

 [facility]



     mg tablet      3-02 04-                          tablet by           ity 

of



               00:00: 04:59                          mouth 2           [place]



               00   :00                           (two)           Medical



                                                  times           Branch



                                                  daily with           



                                                  meals for           



                                                  30 days.           

 

     metFORMIN      2020-0 2020- No        [id number] 500mg      Take 1           

[facility]



     500 mg      3-02 04-                          tablet by           ity of



     tablet      00:00: 04:59                          mouth 2           [place]



               00   :00                           (two)           Medical



                                                  times           [place]



                                                  daily with           



                                                  meals for           



                                                  30 days.           

 

     glyBURIDE 5      2020-0 2020- No        [id number] 5mg       Take 1          

 [facility]



     mg tablet      3-02 04-                          tablet by           ity 

of



               00:00: 04:59                          mouth 2           [place]



               00   :00                           (two)           Medical



                                                  times           [place]



                                                  daily with           



                                                  meals for           



                                                  30 days.           

 

     metFORMIN      2020-0 2020- No        [id number] 500mg      Take 1           

[facility]



     500 mg      3-02 04-                          tablet by           ity of



     tablet      00:00: 04:59                          mouth 2           [place]



               00   :00                           (two)           Medical



                                                  times           [place]



                                                  daily with           



                                                  meals for           



                                                  30 days.           

 

     NaCl 0.9%      2020-0      Yes            1000mL      at 75           Unive

rs



     (NS) IV      3-01                               mL/hr, IV           ity of



     infusion      23:45:                               Infusion,           [place]

[place]



     1,000 mL      00                                 CONTINUOUS           Medic

al



                                                  , Starting           Branch



                                                  Sun 3/1/20           



                                                  at 1745,           



                                                  Until           



                                                  Discontinu           



                                                  ed,            



                                                  Routine           

 

     clopidogreL      2020-0      Yes            75mg      75 mg,           Univ

ers



     (PLAVIX)      3-                               Oral,           ity of



     tablet 75      15:00:                               DAILY,           [place]



     mg        00                                 First dose           Medical



                                                  on [facility]



                                                  3/1/20 at           



                                                  0900,           



                                                  Until           



                                                  Discontinu           



                                                  ed,            



                                                  Routine           

 

     glyBURIDE      2020-0      Yes            5mg       5 mg,           [facility]



     (DIABETA)      3-                               Oral, BID           ity o

f



     tablet 5 mg      14:30:                               MEALS,           [place]

[place]



               00                                 First dose           Medical



                                                  on [facility]



                                                  3/1/20 at           



                                                  0830,           



                                                  Until           



                                                  Discontinu           



                                                  ed,            



                                                  Routine           

 

     gabapentin      2020-0      Yes            600mg      600 mg,           Uni

vers



     (NEURONTIN)      3-01                               Oral, TID,           it

y of



     tablet 600      14:00:                               First dose           [facility]

[place]



     mg        00                                 on [facility]



                                                  3/1/20 at           Branch



                                                  0800,           



                                                  Until           



                                                  Discontinu           



                                                  ed,            



                                                  Routine           

 

     carvediloL      2020-0      Yes            6.25mg      6.25 mg,           U

jaquelin



     (COREG)      3-01                               Oral, BID,           ity of



     tablet 6.25      14:00:                               First dose           

[place]



     mg        00                                 on [facility]



                                                  3/1/20 at           Branch



                                                  0800,           



                                                  Until           



                                                  Discontinu           



                                                  ed,            



                                                  Routine           

 

     cetirizine      2020-0 2020- No             5mg       5 mg,           Unive

rs



     (ZYRTEC)      3-01 03                          Oral,           ity of



     tablet 5 mg      06:15: 05:20                          ONCE, 1           [place]

[place]



               00   :00                           dose, [facility]



                                                  3/1/20 at           Branch



                                                  0015,           



                                                  Routine           

 

     fluticasone      2020-0      Yes            1{puff}      1 Puff,           

[facility]



     propion-sal      3-01                               Inhalation           it

y of



     meterol      05:15:                               , Q12H,           [place]



     (ADVAIR)      00                                 First dose           Medic

al



     250-50                                         on Sat           Branch



     mcg/dose                                         20 at           



     inhalation                                         2315,           



     disk 1 Puff                                         Until           



                                                  Discontinu           



                                                  ed,            



                                                  Routine<br           



                                                  >Use           



                                                  approved           



                                                  by             



                                                  (Faculty           



                                                  and            



                                                  pager):           



                                                  ADC            



                                                  PROVIDER           

 

     tamsulosin      2020-0      Yes            .4mg      0.4 mg,           Univ

ers



     (FLOMAX)      3-01                               Oral,           ity of



     capsule 0.4      05:15:                               DAILY,           [place]

[place]



     mg        00                                 First dose           Medical



                                                  on [facility]



                                                  20 at           



                                                  2315,           



                                                  Until           



                                                  Discontinu           



                                                  ed,            



                                                  Routine           

 

     pravastatin      2020-0      Yes            80mg      80 mg,           Univ

ers



     (PRAVACHOL)      3-01                               Oral, QHS,           it

y of



     tablet 80      05:15:                               First dose           [place]

[place]



     mg        00                                 on [facility]



                                                  20 at           Branch



                                                  2315,           



                                                  Until           



                                                  Discontinu           



                                                  ed,            



                                                  Routine           

 

     nitroglycer      2020-0      Yes            .4mg      0.4 mg,           Uni

vers



     in        3-01                               Sublingual           ity of



     (NITROSTAT)      04:59:                               , Q5MIN           Fer

as



     sublingual      35                                 PRN,           Medical



     tablet 0.4                                         Starting           Branc

h



     mg                                           Sat            



                                                  20 at           



                                                  2259,           



                                                  Until           



                                                  Discontinu           



                                                  ed,            



                                                  Routine,           



                                                  Chest pain           

 

     ipratropium      2020-0      Yes            3mL       3 mL,           Unive

rs



     -albuterol      3-01                               Inhalation           ity

 of



     (DUONEB)      04:52:                               , QIDPRN,           [place]

[place]



     0.5 mg-3      22                                 Starting           Medical



     mg(2.5 mg                                         Sat            Branch



     base)/3 mL                                         20 at           



     nebulizer                                         2252,           



     solution 3                                         Until           



     mL                                           Discontinu           



                                                  ed,            



                                                  Routine,           



                                                  Wheezing,           



                                                  Shortness           



                                                  of Breath           

 

     nicotine      2020-0      Yes            1{patch      1 Patch,           Un

edie



     (NICODERM)      3-01                     }         Topical,           ity o

f



     21 mg/24 hr      03:45:                               Administer           

[place]



     patch 1      00                                 over 24           Medical



     Patch                                         Hours,           Branch



                                                  Q24H,           



                                                  First dose           



                                                  on Sat           



                                                  20 at           



                                                  2145,           



                                                  Until           



                                                  Discontinu           



                                                  ed,            



                                                  Routine           

 

     insulin      -0 2020- No             10U       10 Units,           Univ

ers



     regular      3-01 02-29                          Subcutaneo           ity o

f



     human      00:00: 23:37                          us, ONCE,           [place]



     (HUMULIN R)      00   :00                           1 dose,           Medic

al



     injection                                         Sat            Branch



     10 Units                                         20 at           



                                                  1800,           



                                                  Routine           

 

     Sliding      -0      Yes                      Subcutaneo           [facility]

ers



     Scale                                     us, TID           ity of



     Insulin -      23:00:                               MEALS+HS,           Fer

as



     Aspart      00                                 First dose           Medical



     (NOVOLOG) +                                         on Sat           Branch



     Fsbg                                         20 at           



     Testing                                         1700,           



                                                  Until           



                                                  Discontinu           



                                                  ed,            



                                                  Routine           

 

     NaCl 0.9%      2020-0 2020- No             1000mL      at 125           Uni

vers



     (NS) IV       03-01                          mL/hr, IV           ity of



     infusion      22:45: 23:33                          Infusion,           Fer

as



     1,000 mL      00   :30                           CONTINUOUS           Medic

al



                                                  , Starting           Branch



                                                  Sat            



                                                  20 at           



                                                  1645,           



                                                  Until Sun           



                                                  3/1/20 at           



                                                  1733,           



                                                  Routine           

 

     ondansetron      2020-0      Yes            4mg       4 mg, Slow           

[facility]



     (ZOFRAN                                     IV Push,           ity of



     (PF))      22:42:                               Q6HPRN,           [place]



     injection 4      59                                 Starting           Medi

tess



     mg                                           Sat            Branch



                                                  20 at           



                                                  1642,           



                                                  Until           



                                                  Discontinu           



                                                  ed,            



                                                  Routine,           



                                                  Nausea and           



                                                  Vomiting           



                                                  (N/V)           

 

     acetaminoph      2020-0      Yes            650mg      650 mg,           Un

edie



     en                                       Oral,           ity of



     (TYLENOL)      22:42:                               Q6HPRN,           [place]



     tablet 650      54                                 Starting           Medic

al



     mg                                           Sat            Branch



                                                  20 at           



                                                  1642,           



                                                  Until           



                                                  Discontinu           



                                                  ed,            



                                                  Routine,           



                                                  Pain           



                                                  (scale           



                                                  1-3)           

 

     insulin      -0 2020- No             10U       10 Units,           Univ

ers



     regular                                Slow IV           ity of



     human      21:00: 20:13                          Push,           [place]



     (HUMULIN R)      00   :00                           ONCE, 1           Medic

al



     injection                                         dose, Sat           Branc

h



     10 Units                                         20 at           



                                                  1500,           



                                                  Routine           

 

     NaCl 0.9%      0 2020- No             1000mL      at 999           Uni

vers



     (NS) bolus                                mL/hr,           ity of



     infusion      20:15: 19:12                          1,000 mL,           Fer

as



     1,000 mL      00   :00                           IV             Medical



                                                  Infusion,           Branch



                                                  ONCE, 1           



                                                  dose, Sat           



                                                  20 at           



                                                  1415, STAT           

 

     NaCl 0.9%      -0 2020- No             1000mL      at 999           Uni

vers



     (NS) bolus                                mL/hr,           ity of



     infusion      20:00: 19:38                          1,000 mL,           Fer

as



     1,000 mL      00   :00                           IV             Medical



                                                  Infusion,           Branch



                                                  ONCE, 1           



                                                  dose, Sat           



                                                  20 at           



                                                  1400, STAT           

 

     tamsulosin            Yes            .4mg      Take 0.4           Uni

vers



     0.4 mg 24      9-12                               mg by           ity of



     hr capsule      00:36:                               mouth           [place]



               00                                 daily.           Medical



                                                                 Branch

 

     tamsulosin      -0      Yes            .4mg      Take 0.4           Uni

vers



     0.4 mg 24      9-12                               mg by           ity of



     hr capsule      00:36:                               mouth           [place]



               00                                 daily.           Medical



                                                                 Branch

 

     tamsulosin      -0      Yes            .4mg      Take 0.4           Uni

vers



     0.4 mg 24      9-12                               mg by           ity of



     hr capsule      00:36:                               mouth           [place]



               00                                 daily.           Medical



                                                                 Branch

 

     nitroglycer      2017      Yes            .4mg      Place 1           Uni

vers



     in 0.4 mg      0-08                               tablet           ity of



     sublingual      00:00:                               under the           Te

[place]



     tablet      00                                 tongue           Medical



                                                  every 5           Branch



                                                  (five)           



                                                  minutes as           



                                                  needed for           



                                                  Chest           



                                                  pain.           

 

     albuterol      2017      Yes            2{puff}      Inhale 2           U

nivers



     (VENTOLIN      0-08                               Puffs           ity of



     HFA) 90      00:00:                               every 6           [place]



     mcg/actuati      00                                 (six)           Medical



     on inhaler                                         hours as           [facility]

h



                                                  needed for           



                                                  Wheezing           



                                                  or             



                                                  Shortness           



                                                  of Breath.           

 

     fluticasone      2017      Yes            1{puff}      Inhale 1          

 [facility]



     -salmeterol      0-08                               Puff every           it

y of



     250-50      00:00:                               12             [place]



     mcg/dose      00                                 (twelve)           [facility]



     inhalation                                         hours.           [facility]



     disk                                                        

 

     nitroglycer      2017      Yes            .4mg      Place 1           [facility]

[facility]



     in 0.4 mg      0-08                               tablet           [facility]



     sublingual      00:00:                               under the           [facility]

[place]



     tablet      00                                 tongue           [facility]



                                                  every 5           [facility]



                                                  (five)           



                                                  minutes as           



                                                  needed for           



                                                  Chest           



                                                  pain.           

 

     nitroglycer      2017      Yes            .4mg      Place 1           [facility]

[facility]



     in 0.4 mg      0-08                               tablet           [facility]



     sublingual      00:00:                               under the           [facility]

[facility]



     tablet      00                                 tongue           [facility]



                                                  every 5           [facility]



                                                  (five)           



                                                  minutes as           



                                                  needed for           



                                                  Chest           



                                                  pain.           

 

     albuterol      2017      Yes            2{puff}      Inhale 2           [facility]

[facility]



     (VENTOLIN      0-08                               Puffs           [facility] of



     HFA) 90      00:00:                               every 6           [place]



     mcg/actuati      00                                 (six)           [facility]



     on inhaler                                         hours as           [facility]

h



                                                  needed for           



                                                  Wheezing           



                                                  or             



                                                  Shortness           



                                                  of Breath.           

 

     fluticasone      2017      Yes            1{puff}      Inhale 1          

 [facility]



     -salmeterol      0-08                               Puff every           it

y of



     250-50      00:00:                               12             [place]



     mcg/dose      00                                 (twelve)           [facility]



     inhalation                                         hours.           [facility]



     disk                                                        

 

     albuterol      2017      Yes            2{puff}      Inhale 2           [facility]

[facility]



     (VENTOLIN      0-08                               Puffs           [facility] of



     HFA) 90      00:00:                               every 6           [place]



     mcg/actuati      00                                 (six)           [facility]



     on inhaler                                         hours as           [facility]

h



                                                  needed for           



                                                  Wheezing           



                                                  or             



                                                  Shortness           



                                                  of Breath.           

 

     fluticasone      2017      Yes            1{puff}      Inhale 1          

 [facility]



     -salmeterol      0-08                               Puff every           it

y of



     250-50      00:00:                               12             [place]



     mcg/dose      00                                 (twelve)           [facility]



     inhalation                                         hours.           [facility]



     disk                                                        

 

     nitroglycer      2017      Yes            .4mg      Place 1           [facility]

[facility]



     in 0.4 mg      0-08                               tablet           [facility]



     sublingual      00:00:                               under the           [facility]

[facility]



     tablet      00                                 tongue           [facility]



                                                  every 5           [facility]



                                                  (five)           



                                                  minutes as           



                                                  needed for           



                                                  Chest           



                                                  pain.           

 

     albuterol      2017      Yes            2{puff}      Inhale 2           [facility]

[facility]



     (VENTOLIN      0-08                               Puffs           [facility] of



     HFA) 90      00:00:                               every 6           [place]



     mcg/actuati      00                                 (six)           [facility]



     on inhaler                                         hours as           [facility]

h



                                                  needed for           



                                                  Wheezing           



                                                  or             



                                                  Shortness           



                                                  of Breath.           

 

     fluticasone      2017      Yes            1{puff}      Inhale 1          

 [facility]



     -salmeterol      0-08                               Puff every           it

y of



     250-50      00:00:                               12             [place]



     mcg/dose      00                                 (twelve)           [facility]



     inhalation                                         hours.           [facility]



     disk                                                        

 

     nitroglycer      2017      Yes            .4mg      Place 1           [facility]

[facility]



     in 0.4 mg      0-08                               tablet           [facility]



     sublingual      00:00:                               under the           [facility]

[facility]



     tablet      00                                 tongue           [facility]



                                                  every 5           [facility]



                                                  (five)           



                                                  minutes as           



                                                  needed for           



                                                  Chest           



                                                  pain.           

 

     albuterol      2017      Yes            2{puff}      Inhale 2           [facility]

[facility]



     (VENTOLIN      0-08                               Puffs           [facility] of



     HFA) 90      00:00:                               every 6           [place]



     mcg/actuati      00                                 (six)           [facility]



     on inhaler                                         hours as           [facility]

h



                                                  needed for           



                                                  Wheezing           



                                                  or             



                                                  Shortness           



                                                  of Breath.           

 

     fluticasone      2017      Yes            1{puff}      Inhale 1          

 [facility]



     -salmeterol      0-08                               Puff every           it

y of



     250-50      00:00:                               12             [place]



     mcg/dose      00                                 (twelve)           [facility]



     inhalation                                         hours.           [facility]



     disk                                                        

 

     nitroglycer      2017      Yes            .4mg      Place 1           [facility]

[facility]



     in 0.4 mg      0-08                               tablet           [facility]



     sublingual      00:00:                               under the           [facility]

[facility]



     tablet      00                                 tongue           [facility]



                                                  every 5           [facility]



                                                  (five)           



                                                  minutes as           



                                                  needed for           



                                                  Chest           



                                                  pain.           

 

     albuterol      2017      Yes            2{puff}      Inhale 2           [facility]

n[facility]



     (VENTOLIN      0-08                               Puffs           [facility] of



     HFA) 90      00:00:                               every 6           [place]



     mcg/actuati      00                                 (six)           [facility]



     on inhaler                                         hours as           [facility]

h



                                                  needed for           



                                                  Wheezing           



                                                  or             



                                                  Shortness           



                                                  of Breath.           

 

     fluticasone      2017      Yes            1{puff}      Inhale 1          

 [facility]



     -salmeterol      0-08                               Puff every           it

y of



     250-50      00:00:                               12             [place]



     mcg/dose      00                                 (twelve)           [facility]



     inhalation                                         hours.           [facility]



     disk                                                        

 

     nitroglycer      2017      Yes            .4mg      Place 1           [facility]

[facility]



     in 0.4 mg      0-08                               tablet           [facility]



     sublingual      00:00:                               under the           [facility]

[facility]



     tablet      00                                 tongue           [facility]



                                                  every 5           [facility]



                                                  (five)           



                                                  minutes as           



                                                  needed for           



                                                  Chest           



                                                  pain.           

 

     albuterol      2017      Yes            2{puff}      Inhale 2           U

n[facility]



     (VENTOLIN      0-08                               Puffs           [facility] of



     HFA) 90      00:00:                               every 6           [place]



     mcg/actuati      00                                 (six)           [facility]



     on inhaler                                         hours as           [facility]

h



                                                  needed for           



                                                  Wheezing           



                                                  or             



                                                  Shortness           



                                                  of Breath.           

 

     fluticasone      2017      Yes            1{puff}      Inhale 1          

 [facility]



     -salmeterol      0-08                               Puff every           it

y of



     250-50      00:00:                               12             [place]



     mcg/dose      00                                 (twelve)           [facility]



     inhalation                                         hours.           [facility]



     disk                                                        

 

     nitroglycer      2017      Yes            .4mg      Place 1           [facility]

[facility]



     in 0.4 mg      0-08                               tablet           [facility]



     sublingual      00:00:                               under the           [facility]

[facility]



     tablet      00                                 tongue           [facility]



                                                  every 5           [facility]



                                                  (five)           



                                                  minutes as           



                                                  needed for           



                                                  Chest           



                                                  pain.           

 

     albuterol      2017      Yes            2{puff}      Inhale 2           U

n[facility]



     (VENTOLIN      0-08                               Puffs           ity of



     HFA) 90      00:00:                               every 6           [place]



     mcg/actuati      00                                 (six)           Medical



     on inhaler                                         hours as           [facility]

h



                                                  needed for           



                                                  Wheezing           



                                                  or             



                                                  Shortness           



                                                  of Breath.           

 

     fluticasone      2017      Yes            1{puff}      Inhale 1          

 [facility]



     -salmeterol      0-08                               Puff every           it

y of



     250-50      00:00:                               12             [place]



     mcg/dose      00                                 (twelve)           [facility]



     inhalation                                         hours.           [facility]



     disk                                                        

 

     diclofenac      2017-0      Yes            75mg      Take 1           [facility]

[facility]



     75 mg EC      5-12                               tablet by           [facility]



     tablet      00:00:                               mouth 2           [place]



                                                (two)           [facility]



                                                  times           [facility]



                                                  daily with           



                                                  meals.           

 

     diclofenac      2017-0      Yes            75mg      Take 1           [facility]

[facility]



     75 mg EC      5-12                               tablet by           [facility]



     tablet      00:00:                               mouth 2           [place]



                                                (two)           [facility]



                                                  times           [facility]



                                                  daily with           



                                                  meals.           

 

     diclofenac      2017-0      Yes            75mg      Take 1           [facility]

[facility]



     75 mg EC      5-12                               tablet by           [facility]



     tablet      00:00:                               mouth 2           [place]



                                                (two)           [facility]



                                                  times           [facility]



                                                  daily with           



                                                  meals.           

 

     diclofenac      2017-0      Yes            75mg      Take 1           [facility]

rs



     75 mg EC      5-12                               tablet by           ity of



     tablet      00:00:                               mouth 2           [place]



                                                (two)           [facility]



                                                  times           [facility]



                                                  daily with           



                                                  meals.           

 

     diclofenac      2017-0      Yes            75mg      Take 1           [facility]

rs



     75 mg EC      5-12                               tablet by           [facility]



     tablet      00:00:                               mouth 2           [place]



                                                (two)           [facility]



                                                  times           [facility]



                                                  daily with           



                                                  meals.           

 

     clopidogrel      2017-0      Yes            75mg      Take 75 mg           

[facility]



     75 mg      2-23                               by mouth           [facility]



     tablet      00:00:                               daily.           [place]



                                                               [facility]



                                                                 [facility]

 

     clopidogrel      2017-0      Yes            75mg      Take 75 mg           

[facility]



     75 mg      2-23                               by mouth           ity of



     tablet      00:00:                               daily.           [place]



                                                               [facility]

 

     glyBURIDE 5      2017-0      Yes            5mg       5 mg daily           

[facility]



     mg tablet      2-23                               with           ity of



               00:00:                               breakfast.           [place]



                                                               [facility]

 

     clopidogrel      2017-0      Yes            75mg      Take 75 mg           

[facility]



     75 mg      2-23                               by mouth           ity of



     tablet      00:00:                               daily.           [place]



                                                               [facility]

 

     clopidogrel      2017-0      Yes            75mg      Take 75 mg           

[facility]



     75 mg      2-23                               by mouth           ity of



     tablet      00:00:                               daily.           [place]



                                                               [facility]

 

     clopidogrel      2017-0      Yes            75mg      Take 75 mg           

[facility]



     75 mg      2-23                               by mouth           ity of



     tablet      00:00:                               daily.           [place]



                                                               [facility]

 

     clopidogrel      2017-0      Yes            75mg      Take 75 mg           

[facility]



     75 mg      2-23                               by mouth           ity of



     tablet      00:00:                               daily.           [place]



                                                               [facility]

 

     glyBURIDE 5      2017-0      Yes            5mg       5 mg daily           

[facility]



     mg tablet      2-23                               with           ity of



               00:00:                               breakfast.           [place]



                                                               [facility]

 

     clopidogrel      2017-0      Yes            75mg      Take 75 mg           

[facility]



     75 mg      2-23                               by mouth           ity of



     tablet      00:00:                               daily.           [place]



                                                               [facility]

 

     glyBURIDE 5      2017-0      Yes            5mg       5 mg daily           

[facility]



     mg tablet      2-23                               with           ity of



               00:00:                               breakfast.           [place]



                                                               [facility]

 

     clopidogrel      2017-0      Yes            75mg      Take 75 mg           

[facility]



     75 mg      2-23                               by mouth           ity of



     tablet      00:00:                               daily.           [place]



                                                               [facility]

 

     glyBURIDE 5      2017-0 2020- No             5mg       5 mg daily          

 [facility]



     mg tablet      2-                          with           ity of



               00:00: 00:00                          breakfast.           [place]



               00   :00                                          [facility]

 

     gabapentin      2017-0      Yes            600mg      Take 600           [facility]

[facility]



     600 mg      2-16                               mg by           ity of



     tablet      00:00:                               mouth 3           [place]



                                                (three)           Medical



                                                  times           [facility]



                                                  daily.           

 

     gabapentin      2017-0      Yes            600mg      Take 600           [facility]

[facility]



     600 mg      2-16                               mg by           ity of



     tablet      00:00:                               mouth 3           [place]



                                                (three)           Medical



                                                  times           [facility]



                                                  daily.           

 

     gabapentin      2017-0      Yes            600mg      Take 600           Un

[facility]



     600 mg      2-16                               mg by           ity of



     tablet      00:00:                               mouth 2           [place]



                                                (two)           Medical



                                                  times           [facility]



                                                  daily.           

 

     gabapentin      2017-0      Yes            600mg      Take 600           Un

[facility]



     600 mg      2-16                               mg by           ity of



     tablet      00:00:                               mouth 2           [place]



               00                                 (two)           Medical



                                                  times           [facility]



                                                  daily.           

 

     gabapentin      2017-0      Yes            600mg      Take 600           [facility]

[facility]



     600 mg      2-16                               mg by           ity of



     tablet      00:00:                               mouth 2           [place]



               00                                 (two)           Medical



                                                  times           Branch



                                                  daily.           

 

     gabapentin      2017-0      Yes            600mg      Take 600           Un

edie



     600 mg      2-16                               mg by           ity of



     tablet      00:00:                               mouth 3           [place]



               00                                 (three)           Medical



                                                  times           Branch



                                                  daily.           

 

     gabapentin      2017-0      Yes            600mg      Take 600           Un

edie



     600 mg      2-16                               mg by           ity of



     tablet      00:00:                               mouth 3           [place]



               00                                 (three)           Medical



                                                  times           Branch



                                                  daily.           

 

     gabapentin      2017-0      Yes            600mg      Take 600           Un

edie



     600 mg      2-16                               mg by           ity of



     tablet      00:00:                               mouth 3           [place]



               00                                 (three)           Medical



                                                  times           Branch



                                                  daily.           

 

     COREG 6.25      -0      Yes                      1 tab PO           Uni

vers



     MG ORAL TAB      5-18                               BID            ity of



               00:00:                                              [place]



                                                               Medical



                                                                 Branch

 

     METFORMIN      -0      Yes                      1 tab PO           Univ

ers



     500 MG ORAL      5-18                               BID            ity of



     TAB       00:00:                                              [place]



                                                               Medical



                                                                 Branch

 

     BENAZEPRIL      -0      Yes                      1 tab PO           Uni

vers



     20 MG ORAL      5-18                               daily           ity of



     TAB       00:00:                                              [place]



                                                               Medical



                                                                 Branch

 

     PRAVASTATIN      -0      Yes                      take 2           Univ

ers



     40 MG ORAL      5-18                               tabs qd           ity of



     TAB       00:00:                                              [place]



               00                                                Medical



                                                                 Branch

 

     COREG 6.25      -0      Yes                      1 tab PO           Uni

vers



     MG ORAL TAB      5-18                               BID            ity of



               00:00:                                              [place]



                                                               Medical



                                                                 Branch

 

     BENAZEPRIL      -0      Yes                      1 tab PO           Uni

vers



     20 MG ORAL      5-18                               daily           ity of



     TAB       00:00:                                              [place]



               00                                                Medical



                                                                 Branch

 

     PRAVASTATIN      -0      Yes                      take 2           Univ

ers



     40 MG ORAL      5-18                               tabs qd           ity of



     TAB       00:00:                                              [place]



               00                                                Medical



                                                                 Branch

 

     PRAVASTATIN      -0      Yes                      take 2           Univ

ers



     40 MG ORAL      5-18                               tabs qd           ity of



     TAB       00:00:                                              [place]



               00                                                Medical



                                                                 Branch

 

     PRAVASTATIN      -0      Yes                      take 2           Univ

ers



     40 MG ORAL      5-18                               tabs qd           ity of



     TAB       00:00:                                              [place]



               00                                                Medical



                                                                 Branch

 

     PRAVASTATIN      -0      Yes                      take 2           Univ

ers



     40 MG ORAL      5-18                               tabs qd           ity of



     TAB       00:00:                                              [place]



               00                                                Medical



                                                                 Branch

 

     COREG 6.25      -0      Yes                      1 tab PO           Uni

vers



     MG ORAL TAB      5-18                               BID            ity of



               00:00:                                              [place]



               00                                                Medical



                                                                 Branch

 

     METFORMIN            Yes                      1 tab PO           Univ

ers



     500 MG ORAL      5-18                               BID            ity of



     TAB       00:00:                                              [place]



               00                                                Medical



                                                                 Branch

 

     BENAZEPRIL            Yes                      1 tab PO           Uni

vers



     20 MG ORAL      5-18                               daily           ity of



     TAB       00:00:                                              [place]



               00                                                Medical



                                                                 Branch

 

     PRAVASTATIN            Yes                      take 2           Univ

ers



     40 MG ORAL      5-18                               tabs qd           ity of



     TAB       00:00:                                              [place]



               00                                                Medical



                                                                 Branch

 

     COREG 6.25            Yes                      1 tab PO           Uni

vers



     MG ORAL TAB      5-18                               BID            ity of



               00:00:                                              [place]



               00                                                Medical



                                                                 Branch

 

     METFORMIN            Yes                      1 tab PO           Univ

ers



     500 MG ORAL      5-18                               BID            ity of



     TAB       00:00:                                              [place]



               00                                                Medical



                                                                 Branch

 

     BENAZEPRIL            Yes                      1 tab PO           Uni

vers



     20 MG ORAL      5-18                               daily           ity of



     TAB       00:00:                                              [place]



               00                                                Medical



                                                                 Branch

 

     PRAVASTATIN            Yes                      take 2           Univ

ers



     40 MG ORAL      5-18                               tabs qd           ity of



     TAB       00:00:                                              [place]



               00                                                Medical



                                                                 Branch

 

     COREG 6.25            Yes                      1 tab PO           Uni

vers



     MG ORAL TAB      5-18                               BID            ity of



               00:00:                                              [place]



               00                                                Medical



                                                                 Branch

 

     BENAZEPRIL            Yes                      1 tab PO           Uni

vers



     20 MG ORAL      5-18                               daily           ity of



     TAB       00:00:                                              [place]



               00                                                Medical



                                                                 Branch

 

     PRAVASTATIN            Yes                      take 2           Univ

ers



     40 MG ORAL      5-18                               tabs qd           ity of



     TAB       00:00:                                              [place]



               00                                                Medical



                                                                 Branch

 

     METFORMIN      0 2020- No                       1 tab PO           Uni

vers



     500 MG ORAL      5-18 -                          BID            ity of



     TAB       00:00: 00:00                                         [place]



               00   :00                                          Medical



                                                                 Branch







Vital Signs







             Vital Name   Observation Time Observation Value Comments     Source

 

             Systolic blood 2020 17:00:00 108 mm[Hg]                Univer

sity of



             pressure                                            [facility]

 

             Diastolic blood 2020 17:00:00 80 mm[Hg]                 Unive

rsity of



             pressure                                            [facility]

 

             Heart [facility]   2020 17:00:00 67 /min                   [facility]

[facility]

 

             Body temperature 2020 17:00:00 36.17 Em                 Univ

ersity [facility]

 

             Respiratory rate 2020 17:00:00 16 /min                   Univ

ersity [facility]

 

             Oxygen saturation in 2020 17:00:00 99 /min                   

[facility] blood by                                        [place] [facility]i

tess



             Pulse oximetry                                        Branch

 

             Body weight  2020 20:45:00 127.461 kg                [facility]

 

             BMI          2020 20:45:00 38.11 kg/m2               [facility]

 

             Systolic blood 2020 17:00:00 108 mm[Hg]                Univer

sity [facility]

 

             Diastolic blood 2020 17:00:00 80 mm[Hg]                 [facility]

[facility]

 

             Heart rate   2020 17:00:00 67 /min                   [facility]

 

             Body temperature 2020 17:00:00 36.17 Em                 [facility]

[facility]

 

             Respiratory rate 2020 17:00:00 16 /min                   [facility]

 

             Oxygen saturation in 2020 17:00:00 99 /min                   

[facility]



             Arterial blood by                                        [place] Medi

tess



             Pulse oximetry                                        Branch

 

             Body weight  2020 20:45:00 127.461 kg                [facility]

 

             BMI          2020 20:45:00 38.11 kg/m2               [facility]







Procedures







                Procedure       Date / Time     Performing Clinician Source



                                Performed                       

 

                SLEEP STUDY DATA REPORT 2023 05:01:00 Doctor Unassigned, N

o [facility]

 

                EXTERNAL PROVIDER 2023 06:01:00 Doctor Unassigned, No [facility]

[facility]



                RECORDS                         [facility]

 

                POCT GLUCOSE    2020 13:37:00 Chris [facility]



                (AUTOMATED)                                     [facility]

 

                BASIC METABOLIC PANEL 2020 09:02:00 Mark Yee  [facility]



                (NA, K, CL, CO2,                                 Medical Branch



                GLUCOSE, BUN,                                   



                CREATININE, CA)                                 

 

                POCT GLUCOSE    2020 01:18:00 Chris [facility]



                (AUTOMATED)                                     Medical Branch

 

                POCT GLUCOSE    2020 21:23:00 Chris [facility]



                (AUTOMATED)                                     Medical Branch

 

                POCT GLUCOSE    2020 17:02:00 Chris [facility]



                (AUTOMATED)                                     Medical Branch

 

                POCT GLUCOSE    2020 14:47:00 Chris [facility]



                (AUTOMATED)                                     Medical Branch

 

                POCT GLUCOSE    2020 11:08:00 Chris [facility]



                (AUTOMATED)                                     Medical Branch

 

                MAGNESIUM       2020 10:29:00 Chris [facility]

 

                TROPONIN I      2020 10:29:00 [facility]

 

                [facility]IC METABOLIC PANEL 2020 [facility]:29:00 [facility]



                (NA, K, CL, CO2,                                 Medical Branch



                GLUCOSE, BUN,                                   



                CREATININE, CA)                                 

 

                [facility] DIFFERENTIAL 2020 [facility]:29:[facility]



                                                                [facility] [place]

 

                [facility]COSE    2020 06:56[facility]



                (AUTOMATED)                                     [facility]

 

                XR CHEST 1 VW   2020 03:42:03 [facility]

 

                TROPONIN I      2020 03:26:00 [facility]

 

                BASIC METABOLIC PANEL 2020 03:26:00 [facility]



                (NA, K, CL, CO2,                                 Medical Branch



                GLUCOSE, BUN,                                   



                CREATININE, CA)                                 

 

                POCT GLUCOSE    2020 03:09:00 [facility]



                (AUTOMATED)                                     [facility]

 

                BASIC METABOLIC PANEL 2020 00:35:00 [facility]



                (NA, K, CL, CO2,                                 Medical Branch



                GLUCOSE, BUN,                                   



                CREATININE, CA)                                 

 

                POCT GLUCOSE    2020 23:36:00 [facility]



                (AUTOMATED)                                     Medical Branch

 

                POCT GLUCOSE    2020 20:50:00 [facility]



                (AUTOMATED)                                     Medical Branch

 

                POCT GLUCOSE    2020 20:10:00 [facility]



                (AUTOMATED)                                     [facility]

 

                NOTICE OF PRIVACY 2020 19:51:59 Doctor Unassigned, [facility]



                PRACTICES                       Name            Medical Branch

 

                EKG-12 LEAD     2020 19:27:52 [facility]

 

                TROPONIN I      2020 19:11:00 [facility]

 

                COMP. METABOLIC PANEL 2020 19:11:00 Andrew Solange [facility]

[facility] [place]



                ([id number])                                         Medical Branch

 

                ACUTE CARE VENOUS BLOOD 2020 19:11:00 Solange Mroales Univ

[facility] [place]



                GAS                                             [facility]

 

                CBC WITH DIFFERENTIAL 2020 19:11:00 [facility]

[facility]

 

                GLYCOSYLATED HEMOGLOBIN 2020 19:11:00 Addy Perez       [facility]



                (A1C)                                           [facility]

 

                URINALYSIS      2020 19:11:00 [facility]

 

                POCT GLUCOSE(AGE 2020 19:11:00 [facility]



                >30DAYS)                                        [facility]

 

                EKG-12 LEAD     2020 18[facility]51:31 [facility]

 

                POCT GLUCOSE    2020 18:44:00 [facility]



                (AUTOMATED)                                     [facility]

 

                CONSENT/REFUSAL FOR 2020 18:38:35 Doctor Unassigned, No Un

[facility]



                DIAGNOSIS AND TREATMENT                 [facility]

 

                XR SPINE THORACIC 2 VW 2019 17:26:28 Pankaj Lloyd [facility]

 

                ASSIGNMENT OF BENEFITS 2019 16:54:00 Doctor Unassigned, No

 [facility]







Encounters







        Start   End     Encounter Admission Attending Care    Care    Encounter 

Source



        Date/Time Date/Time Type    Type    Clinicians Facility Department ID   

   

 

        2021-12-15         Inpatient KAREEN Sadler   [facility]    [id number] 

[facility]



        10:00:00                         [address]

 

        2023 Technician         1, [facility] Sleep Lab Bed [facility]    1.

2.840.114 

[id number]                               Univers



        20:00:00 22:30:00 Visit           Sly Oconnor 350.1.13.

10         juan [facility] [id number]         [facility]  [phone]         Medi

tess



                                                        193             Branch

 

        2023 Outpatient R       SLY OCONNOR [facility] 

   [id number] 

Univers



        20:00:00 20:00:00                 SLY OCONNOR                     

    ity [facility]

 

        2023 Orders          Doctor LECHUGA    [id number] [id number]

[id number] Univers



        00:00:00 00:00:00 Only            UnassCANDELARIA mckoy   350.1.13.10       

  ity of



                                        [facility] [id number]         Fer

as



                                                        [phone]         Medi

tess



                                                        009             Branch

 

        2023 Orders          Doctor  ANKUSH    1.2.840.114 [id number]

[id number] Univers



        00:00:00 00:00:00 Only            Unassigned, CANDELARIA   350.1.13.10       

  ity of



                                        [facility] [id number]         Fer

as



                                                        [phone]         Medi

tess



                                                        009             Branch

 

        2023 Outpatient         Atkins, [facility]   [facility]   [id number]

[id number] [facility]



        12:56:00 12:56:00                 Manuel                  24      [facility]

 

        2022-04-15 2022-04-15 Outpatient EL      Lloyd,  [facility]   SUGL    [id number]

[id number] [facility]



        12:36:00 12:36:00                 Vasyl                   19      [facility]

 

        2021 2021-11-10 Inpatient EL      Pepper, [facility]   TELE    [id number]

[id number] [facility]



        08:40:00 09:13:00                 Edmund                 72      [facility]

 

        2021-10-05 2021-10-05 Inpatient EL      ATKINS, [facility]   SUGL    [id number]

[id number] [facility]



        10:30:00 09:19:00                 MANUEL                  17      [facility]

 

        2021 Outpatient         Pepper, [facility]   SUGL    [id number]

[id number] [facility]



        09:00:00 09:00:00                 Edmund                 15      [facility]

 

        2021 Inpatient EL      Pepper, [facility]   SUGL    [id number]

[id number] [facility]



        09:00:00 08:22:00                 Edmund                 76      [facility]

 

        2020 Outpatient X       NANILUPE, [facility]     [id number]

[id number] Univers



        18:10:26 14:25:00                 MARK martinez of



                                                                        [facility]

 

        2020 Transition         Ezekiel Solis  1.2.840.114 [id number]

[id number] Univers



        00:00:00 00:00:00 of Care         Zachary SLY Armando   350.1.13.10         

ity of



                                                [place]   [id number]         [place]

[place]



                                                        [phone]         Medi

tess



                                                        403             Branch

 

        2020 Transition         Ezekiel Solis  1.2.840.114 [id number]

[id number] 



        00:00:00 00:00:00 of Care         Zachary Thompsony   350.1.13.10         



                                                [place]   [id number]         



                                                        [phone]         



                                                        403             

 

        2020 Inpatient X       CHRIS ADDY [facility]     [id number]

[id number] Univers



        12:49:29 11:48:00                                                 ity of



                                                                        [facility]

 

        2020 [facility]    1.2.840.1

14 [id number] 

Univers



        12:49:29 11:48:00 Encounter         Addy Perez 350.1.13.10      

   ity of



                                                [place] [id number]         [facility]  [phone]         Medi

tess



                                                        081             [place]

 

        2020 [facility]    1.2.840.1

14 [id number] 



        12:49:29 11:48:00 Encounter         Addy Perez 350.1.13.10      

   



                                                [place] [id number]         



                                                [place]  [phone]         



                                                        08             

 

        2020 Orders          Doctor LECHUGA    1.2.840.114 [id number]

78 [facility]



        00:00:00 00:00:00 Only            Unassigned, CANDELARIA   350.1.13.10       

  ity of



                                        [facility] 4.2.7.2.686         Fer

as



                                                        [phone]         Medi

tess



                                                        009             [place]

 

        2020 Orders          Doctor  ANKUSH    1.2.840.114 [id number]

78 



        00:00:00 00:00:00 Only            Unassigned, CANDELARIA   350.1.13.10       

  



                                        [facility] 4.2.7.2.686         



                                                        [phone]         



                                                        009             

 

        2019 Hospital         Radiology [facility]    1.2.840.114 [id number]

[id number] Univers



        11:55:23 23:59:00 Encounter                 Susie 350.1.13.10        

 ity of



                                                [place] 4.2.7.2.686         [facility]  [phone]         Medi

tess



                                                        807             [place]

 

        2019 [facility]         Radiology [facility]    1.2.840.114 [id number]

[id number] 



        11:55:23 23:59:00 Encounter                 [place] 350.1.13.10        

 



                                                [place] 4.2.7.2.686         



                                                [place]  [phone]         



                                                        807             

 

        2019 Orders          Doctor  ANKUSH    1.2.840.114 [id number]

10 Univers



        00:00:00 00:00:00 Only            Unassigned, CANDELARIA   350.1.13.10       

  ity of



                                        [facility] 4.2.7.2.686         Fer

as



                                                        [phone]         Medi

tess



                                                        009             [place]

 

        2019 Orders          Doctor  ANKUSH    1.2.840.114 [id number]

10 



        00:00:00 00:00:00 Only            Unassigned, CANDELARIA   350.1.13.10       

  



                                        [address]2.7.2.686         



                                                        [phone]         



                                                        009             







Results







           Test Description Test Time  Test Comments Results    Result     [facility]

e



                                                       Comments   

 

           - CT LD LUNG CA 2022-04-15            *********************          

  



           SCREENING  14:50:00              *********************            



                                            ******************[facility]            



                                            WESTName: SALEEM MOORE :            



                                            1960 Sex:            



                                            M********************            



                                            *********************            



                                            *******************            



                                            Patient Name:            



                                            SALEEM MOORE            



                                            Unit No: [id number]            



                                            EXAMS: CPT CODE:            



                                            [id number] CT LD LUNG            



                                            CA SCREENING 95347            



                                            EXAM: Screening CT            



                                            chest without            



                                            contrast (low dose)            



                                            LOCATION: B2            



                                            INDICATION: Lung            



                                            cancer screening            



                                            TECHNIQUE: Axial 2.5            



                                            mm images of the            



                                            chest were obtained            



                                            using low-dose CT            



                                            technique, with            



                                            sagittal and coronal            



                                            reformats. This exam            



                                            was performed            



                                            according to our            



                                            departmental            



                                            dose-optimization            



                                            program, which            



                                            includes automated            



                                            exposure control,            



                                            adjustment of the mA            



                                            and/or kV according            



                                            to patient size,            



                                            and/or use of            



                                            iterative             



                                            reconstruction            



                                            technique.            



                                            COMPARISON: Low-dose            



                                            CT chest dated            



                                            10/5/2021 DISCUSSION:            



                                            RISK FACTORS: Age: 61            



                                            years Smokin            



                                            pack years NODULES: 5            



                                            mm and 4 mm right            



                                            upper lobe nodules            



                                            (axial image 27), 8            



                                            mm left upper lobe            



                                            nodule (axial image            



                                            35), and 4 mm left            



                                            lower lobe nodule            



                                            (axial image 75) are            



                                            unchanged. No new            



                                            nodule is identified.            



                                            Additional findings:            



                                            Lungs: 8 mm left            



                                            major fissural lymph            



                                            node is unchanged.            



                                            Small calcified left            



                                            upper lobe granuloma            



                                            is also unchanged.            



                                            Mediastinum: The            



                                            heart is normal in            



                                            size. Coronary artery            



                                            calcifications are            



                                            present. Lymph nodes:            



                                            There is no            



                                            mediastinal, hilar,            



                                            or axillary            



                                            lymphadenopathy.            



                                            Osseous               



                                            structures/soft            



                                            tissues: Mild            



                                            degenerative changes            



                                            are seen in the            



                                            spine. Upper abdomen:            



                                            1.3 cm lipid rich            



                                            left adrenal gland            



                                            nodule is present; no            



                                            follow-up is needed.            



                                            IMPRESSION: Lung-Rads            



                                            Category 2.            



                                            RECOMMENDATION:            



                                            Recommend continued            



                                            annual low-dose            



                                            screening CT chest in            



                                            12 months.            



                                            *******************FO            



                                            R INTERNAL CODING            



                                            PURPOSES              



                                            ONLY****************            



                                            RESULT CODE: L2            



                                            FOLLOW UP: L12 **            



                                            Electronically Signed            



                                            by Taryn Barrientos MD on            



                                            04/15/2022 at 1450 **            



                                            Reported and signed            



                                            by: Taryn Barrientos MD            



                                            [facility] NAME:            



                                            SALEEM MOORE            



                                            [address] [address] PHYS: Manuel [place]            



                                            [place] [address]            



                                            : 1960 AGE:            



                                            61 SEX: M ACCT NO:            



                                            [id number] LOC:            



                                            Z.ZCTS PHONE #:            



                                            [phone] EXAM            



                                            DATE: 04/15/2022            



                                            STATUS: REG CLI FAX            



                                            #: [phone] RAD            



                                            #: D/C DT PAGE 1            



                                            Signed Report            



                                            (CONTINUED) Patient            



                                            Name: SALEEM MOORE Unit No:            



                                            [id number] EXAMS: CPT            



                                            CODE: [id number] CT LD            



                                            LUNG CA SCREENING            



                                            [address] (Continued)            



                                            CC: Manuel ACUÑA            



                                            Technologist: Eve Castillo, RT(R) CTDI:            



                                            DLP: Trnscrpt:            



                                            04/15/2022 (1450)            



                                            t.[id number].[id number] [facility]            



                                            NAME: SALEEM MOORE [address] [address]            



                                            PHYS: Manuel Dave [place]            



                                            [place] [address]            



                                            : 1960 AGE:            



                                            61 SEX: M  ACCT NO:            



                                            [id number] LOC:            



                                            LIBBYCTS PHONE #:            



                                            [phone] EXAM            



                                            DATE: 04/15/2022            



                                            STATUS: REG CLI FAX            



                                            #: [phone] RAD            



                                            #:  D/C DT PAGE 2            



                                            Signed Report Patient            



                                            Name: SALEEM MOORE Unit No:            



                                            [id number] EXAMS:            



                                            CPT CODE: [id number]            



                                            CT LD LUNG CA            



                                            SCREENING 90168            



                                            (Continued) Orig            



                                            Print D/T: S:            



                                            04/15/2022 (1450)            



                                            [facility] NAME:            



                                            SALEEM MOORE            



                                            [address] [address]  PHYS: [place] [address]            



                                            : 1960 AGE:            



                                            61 SEX: M ACCT NO:            



                                            [id number] LOC:            



                                            TARIK  PHONE #:            



                                            [phone] EXAM            



                                            DATE: 04/15/2022            



                                            STATUS: REG CLI FAX            



                                            #: [phone] RAD            



                                            #: D/C DT PAGE 3            



                                            Signed Report            









                    BASIC METABOLIC PANEL 2021-11-10 05:54:00 









                      Test Item  Value      Reference Range Interpretation Comme

nts









             SODIUM (test code = NA) 136 MMOL/L   137-145      L            

 

             POTASSIUM (test code = K) 4.1 MMOL/L   3.5-5.1      N            

 

             CHLORIDE (test code = CL) 96 MMOL/L           L            

 

             CARBON DIOXIDE (test code = CO2) 31 MMOL/L    22-30        H       

     

 

             ANION GAP (test code = GAP) 13 MMOL/L    14-24        L            

 

             GLUCOSE (test code = GLU) 213 MG/DL           H            

 

             BLOOD UREA NITROGEN (test code = 26 MG/DL     9-20         H       

     



             BUN)                                                

 

             GLOMERULAR FILTRATION RATE (test > 60                              

     Reporting units: ml/min/1.73



             code = GFR)                                         m2 (Modified MD

RD



                                                                 Formula)Referen

ce Range: > or



                                                                 = 60 ml/min/1.7

3 m2

 

             CREATININE (test code = CREAT) 1.00 MG/DL   0.66-1.25    N         

   

 

             CALCIUM (test code = CA) 9.5 MG/DL    8.4-10.2     N            



CBC W/AUTO DIFF2021-11-10 05:41:00





             Test Item    Value        Reference Range Interpretation Comments

 

             WHITE BLOOD CELL (test code = 6.2 K/MM3    3.8-9.8      N          

  



             WBC)                                                

 

             RED BLOOD CELL (test code = 4.06 M/MM3   3.95-5.67    N            



             RBC)                                                

 

             HEMOGLOBIN (test code = HGB) 11.6 G/DL    12.4-16.7    L           

 

 

             HEMATOCRIT (test code = HCT) 36.8 %       35.9-49.5    N           

 

 

             MEAN CELL VOLUME (test code = 91 fL        81.7-96.1    N          

  



             MCV)                                                

 

             MEAN CELL HGB (test code = MCH) 28.6 pg      27.6-33.2    N        

    

 

             MEAN CELL HGB CONCETRATION 31.5 %       32.9-35.5    L            



             (test code = MCHC)                                        

 

             RED CELL DISTRIBUTION WIDTH 14.2 %       12.1-15.2    N            



             (test code = RDW)                                        

 

             PLATELET COUNT (test code = 190 K/MM3    129-368      N            



             PLT)                                                

 

             MEAN PLATELET VOLUME (test code 10.9 fl      7.4-10.4     H        

    



             = MPV)                                              

 

             NEUTROPHIL % (test code = NT%) 63.4 %       43-75        N         

   

 

             IMMATURE GRANULOCYTE % (test 0.2 %        0.0-2.0      N           

 



             code = IG%)                                         

 

             LYMPHOCYTE % (test code = LY%) 22.1 %       14-44        N         

   

 

             MONOCYTE % (test code = MO%) 9.0 %        4-13         N           

 

 

             EOSINOPHIL % (test code = EO%) 4.5 %        0-6          N         

   

 

             BASOPHIL % (test code = BA%) 0.8 %        0-2          N           

 

 

             NUCLEATED RBC % (test code = 0.0 %        0-1.0        N           

 



             NRBC%)                                              

 

             NEUTROPHIL # (test code = NT#) 3.96 K/mm3   2.0-7.6      N         

   

 

             IMMATURE GRANULOCYTE # (test 0.01 x10 3/uL 0-0.03       N          

  



             code = IG#)                                         

 

             LYMPHOCYTE # (test code = LY#) 1.38 K/mm3   1.0-3.8      N         

   

 

             MONOCYTE # (test code = MO#) 0.56 K/mm3   0.1-0.8      N           

 

 

             EOSINOPHIL # (test code = EO#) 0.28 K/mm3   0.0-0.2      H         

   

 

             BASOPHIL # (test code = BA#) 0.05 K/mm3   0.0-0.2      N           

 

 

             NUCLEATED RBC # (test code = 0.00 K/mm3   0.0-0.1      N           

 



             NRBC#)                                              



GLUCOSE BEDSIDE [id number] 20:00:00





             Test Item    Value        Reference Range Interpretation Comments

 

             GLUCOSE BEDSIDE TESTING (test code 302 MG/DL    60-99        HH    

       



             = GLUBED)                                           



GLUCOSE BEDSIDE [id number] 16:19:00





             Test Item    Value        Reference Range Interpretation Comments

 

             GLUCOSE BEDSIDE TESTING (test code 346 MG/DL    60-99        HH    

       



             = GLUBED)                                           



GLUCOSE BEDSIDE [id number] 11:59:00





             Test Item    Value        Reference Range Interpretation Comments

 

             GLUCOSE BEDSIDE TESTING (test code 265 MG/DL    60-99        H     

       



             = GLUBED)                                           



PROTHROMBIN [id number] 07:22:00





             Test Item    Value        Reference Range Interpretation Comments

 

             PROTHROMBIN TIME 11.2 SECONDS 9.5-12.7     N            



             PATIENT (test code =                                        



             PTP)                                                

 

             INTERNATIONAL NORMAL 1.0          0.86-1.14    N            The INR

 is to be



             RATIO (test code =                                        used only

 for



             INR)                                                monitoring oral



                                                                 anticoagulantth

erap



                                                                 y. INDICATION I

NR



                                                                 VALUE----------

----



                                                                 ---------------

----



                                                                 ---------------

----



                                                                 -------1.



                                                                 Prophylaxis, de

ep



                                                                 venous thrombos

is,



                                                                 including high 

risk



                                                                 surgery. 2.0 - 

3.0



                                                                 2. Prophylaxis,



                                                                 deep venous



                                                                 thrombosis, hip



                                                                 surgery, treatm

ent



                                                                 for deep venous



                                                                 thrombosis or



                                                                 pulmonary



                                                                 prevention of



                                                                 systemic emboli

sm



                                                                 in patients wit

h



                                                                 valvular heart



                                                                 disease, atrial



                                                                 fibrillation,



                                                                 tissue heart va

lve,



                                                                 or acute myocar

dial



                                                                 infarction. 2.0

 -



                                                                 3.0 3. 

al



                                                                 prosthesis hear

t



                                                                 valves, recurre

nt



                                                                 systemic emboli

sm.



                                                                 3.0 - 4.5



PTT [id number] 07:22:00





             Test Item    Value        Reference Range Interpretation Comments

 

             PTT ACTIVATED (test code = APTT) 29.6 SECONDS 25.1-36.5    N       

     



BASIC METABOLIC [id number] 07:19:00





             Test Item    Value        Reference Range Interpretation Comments

 

             SODIUM (test code = 140 MMOL/L   137-145      N            



             NA)                                                 

 

             POTASSIUM (test code = 4.1 MMOL/L   3.5-5.1      N            



             K)                                                  

 

             CHLORIDE (test code = 95 MMOL/L           L            



             CL)                                                 

 

             CARBON DIOXIDE (test 34 MMOL/L    22-30        H            



             code = CO2)                                         

 

             GLUCOSE (test code = 254 MG/DL           H            



             GLU)                                                

 

             BLOOD UREA NITROGEN 27 MG/DL     9-20         H            



             (test code = BUN)                                        

 

             GLOMERULAR FILTRATION > 60                                   Report

ing units:



             RATE (test code = GFR)                                        ml/mi

n/1.73 m2



                                                                 (Modified MDRD



                                                                 Formula)Referen

ce



                                                                 Range: > or = 6

0



                                                                 ml/min/1.73 m2

 

             CREATININE (test code 1.10 MG/DL   0.66-1.25    N            



             = CREAT)                                            

 

             CALCIUM (test code = 9.7 MG/DL    8.4-10.2     N            



             CA)                                                 



Is this a LINE draw? [id number] 07:19:00





             Test Item    Value        Reference Range Interpretation Comments

 

             MAGNESIUM (test code = MAG) 1.3 MG/DL    1.6-2.3      L            



Is this a LINE draw? [facility]BC W/AUTO [id number] 07:09:00





             Test Item    Value        Reference Range Interpretation Comments

 

             WHITE BLOOD CELL (test code = 6.7 K/MM3    3.8-9.8      N          

  



             WBC)                                                

 

             RED BLOOD CELL (test code = 4.34 M/MM3   3.95-5.67    N            



             RBC)                                                

 

             HEMOGLOBIN (test code = HGB) 12.5 G/DL    12.4-16.7    N           

 

 

             HEMATOCRIT (test code = HCT) 40.0 %       35.9-49.5    N           

 

 

             MEAN CELL VOLUME (test code = 92 fL        81.7-96.1    N          

  



             MCV)                                                

 

             MEAN CELL HGB (test code = MCH) 28.8 pg      27.6-33.2    N        

    

 

             MEAN CELL HGB CONCETRATION 31.3 %       32.9-35.5    L            



             (test code = MCHC)                                        

 

             RED CELL DISTRIBUTION WIDTH 14.1 %       12.1-15.2    N            



             (test code = RDW)                                        

 

             PLATELET COUNT (test code = 219 K/MM3    129-368      N            



             PLT)                                                

 

             MEAN PLATELET VOLUME (test code 10.8 fl      7.4-10.4     H        

    



             = MPV)                                              

 

             NEUTROPHIL % (test code = NT%) 67.7 %       43-75        N         

   

 

             IMMATURE GRANULOCYTE % (test 0.3 %        0.0-2.0      N           

 



             code = IG%)                                         

 

             LYMPHOCYTE % (test code = LY%) 19.6 %       14-44        N         

   

 

             MONOCYTE % (test code = MO%) 7.8 %        4-13         N           

 

 

             EOSINOPHIL % (test code = EO%) 3.9 %        0-6          N         

   

 

             BASOPHIL % (test code = BA%) 0.7 %        0-2          N           

 

 

             NUCLEATED RBC % (test code = 0.0 %        0-1.0        N           

 



             NRBC%)                                              

 

             NEUTROPHIL # (test code = NT#) 4.54 K/mm3   2.0-7.6      N         

   

 

             IMMATURE GRANULOCYTE # (test 0.02 x10 3/uL 0-0.03       N          

  



             code = IG#)                                         

 

             LYMPHOCYTE # (test code = LY#) 1.31 K/mm3   1.0-3.8      N         

   

 

             MONOCYTE # (test code = MO#) 0.52 K/mm3   0.1-0.8      N           

 

 

             EOSINOPHIL # (test code = EO#) 0.26 K/mm3   0.0-0.2      H         

   

 

             BASOPHIL # (test code = BA#) 0.05 K/mm3   0.0-0.2      N           

 

 

             NUCLEATED RBC # (test code = 0.00 K/mm3   0.0-0.1      N           

 



             NRBC#)                                              



Is this a LINE draw? NCOVID 19 Asymptomatic IH [id number] 06:35:00





             Test Item    Value        Reference Range Interpretation Comments

 

             COVID 19     NEGATIVE     Negative                  "Negative resul

ts from



             Asymptomatic [facility] AG                                        patients 

with symptom



             (test code =                                        onset beyondfiv

e days,



             COVNONPUIAG)                                        should be treat

ed as



                                                                 presumptive,



                                                                 andconfirmation

 with a



                                                                 molecular assay

, if



                                                                 necessary forpa

tient



                                                                 management may 

be



                                                                 performed. Nega

tive



                                                                 results do notr

ule out



                                                                 COVID-19 and sh

ould not



                                                                 be used as the 

sole



                                                                 basisfor treatm

ent or



                                                                 patient managem

ent



                                                                 decisions,



                                                                 includinginfect

ion



                                                                 control decisio

ns.



                                                                 Negative result

s should



                                                                 beconsidered in

 the



                                                                 context of a pa

tients



                                                                 recent exposure

s,history,



                                                                 and the presenc

e of



                                                                 clinical signs 

and



                                                                 symptomsconsist

ent with



                                                                 COVID-19.This t

est



                                                                 detects both vi

able



                                                                 andnon-viable S

ARS-CoV



                                                                 and SARS CoV-2.

Test



                                                                 performance dep

endson the



                                                                 amount of virus

 (antigen)



                                                                 in the sample."



- CT LD LUNG CA SCREENING2021-10-05 14:57:00
************************************************************[facility] : 1960 Sex: 
M************************************************************ Patient Name: 
SALEEM MOORE Unit No: [id number] EXAMS: CPT CODE: [id number] CT LD LUNG CA
 SCREENING 19812  EXAM: Screening CT chest without contrast (low dose) LOCATION:
 B2 INDICATION: Lung cancer screening TECHNIQUE: Axial 2.5 mm images of the 
chest were obtained using low-dose CT technique, with sagittal and coronal 
reformats. This exam was performed according to our departmental dose-
optimization program, which includes automated exposure control, adjustment of 
the mA and/or kV according to patient size, and/or use of iterative 
reconstruction technique. COMPARISON: None  DISCUSSION: RISK FACTORS: Age: 60 
years Smokin pack years Currently smoking: Yes History of cancer: No  
NODULES: 5 mm right upper lobe nodule (axial image 20), adjacent 4 mm right 
upper lobe nodule (axial image 21), 8 mm left upper lobe nodule (axial image 
36), and 4 mm left lower lobe nodule (axial image 71) are present. 6 mm 
calcified left upper lobe nodule is also noted. Additional findings: Lungs: 8 mm
 left major fissural lymph node is seen.  Mediastinum: The heart is normal in 
size. Coronary artery calcifications are present. 0.9 cm hypodense left thyroid 
nodule is present. Lymph nodes: There is no mediastinal, axillary, or hilar 
lymphadenopathy. Osseous structures/soft tissues: Mild degenerative changes are 
seen in the spine. Upper abdomen: 1.4 cm left superior renal cyst is present. 
IMPRESSION: Lung-Rads Category 4A. RECOMMENDATION: Recommend three-month follow 
up low-dose CT chest. *******************FOR INTERNAL CODING PURPOSES 
ONLY**************** RESULT CODE: L4A FOLLOW UP: L3 ** Electronically Signed by 
Taryn Barrientos MD on 10/05/2021 at 1457 ** Reported and signed by: Taryn Barrientos MD
 [facility] NAME: SALEEM MOORE [id number] [facility], [address] 
[address]: [place] [address] : 1960 AGE: 60 
SEX: M ACCT NO: [id number] LOC: TARIK PHONE #: [phone] DATE: 
10/05/2021 STATUS: REG CLI FAX #: [phone] RAD #: D/C DT PAGE 1 Signed 
Report (CONTINUED) Patient Name: SALEEM MOORE Unit No: [id number] EXAMS: 
CPT CODE: [id number] CT LD LUNG CA SCREENING 07874  (Continued) CC: Manuel ACUÑA  Technologist: Eve Castillo, RT(R) CTDI: DLP: Trnscrpt: 10/05/2021 (1457) 
t.[email or web address]  [facility] NAME: SALEEM MOORE [id number] [facility]: [place] [address] : 
1960 AGE: 60 SEX: M ACCT NO: [id number] LOC: LIBBYCTS PHONE #: 
[phone] EXAM DATE: 10/05/2021 STATUS: REG CLI FAX #: [phone] RAD #: 
D/C DT PAGE 2 Signed Report Patient Name: SALEEM MOORE Unit No: 
[id number] EXAMS: CPT CODE: [id number] CT LD LUNG CA SCREENING 61165 (Continued) 
Orig Print D/T: S: 10/05/2021 (1636) [facility] NAME: 
SALEEM MOORE  [address] [facility] [address]: [place] [address] : 1960 AGE: 60 SEX: M ACCT NO: [id number] LOC: 
LIBBYCTS PHONE #: [phone] EXAM DATE: 10/05/2021 STATUS: REG CLI FAX #: 
[phone] RAD #: D/C DT PAGE 3  Signed Report- NM MYOCRD SPECT R/S MULT
2021 18:16:00
************************************************************[facility] : 1960 Sex: 
M************************************************************ Patient Name: 
SALEEM MOORE Unit No: [id number] EXAMS: CPT CODE: [id number] NM MYOCRD 
SPECT R/S [id number] INDICATION: Unstable angina. The patient underwent 
myocardial perfusion imaging utilizing IV injection of 29.9 mCi Tc99M Cardiolite
 at rest and IV injection of 32.5 mCi Tc99M Cardiolite at peak stress. SPECT 
imaging was obtained at rest and stress. Gated SPECT imaging was obtained at 
stress.  Regadenoson protocol was utilized for stress. FINDINGS: There is a mild
 fixed inferior posterior perfusion defect. CONCLUSIONS: 1. NORMAL REGADENOSON 
TECHNETIUM 99 CARDIOLITE SHOWING A MILD INFERIOR POSTERIOR ATTENUATION ARTIFACT.
 2. GATED PERFUSION IMAGING SHOWS NORMAL LEFT VENTRICULAR SIZE AND SYSTOLIC
FUNCTION WITH NORMAL WALL MOTION. END-DIASTOLIC VOLUME IS 82 mL, END-SYSTOLIC 
VOLUME IS 25 mL, AND THE EJECTION FRACTION IS 70 %. ** Electronically Signed by 
FRANSISCO Jeffrey ** ** on 2021 at 1816 ** Reported and signed by: 
Edmund Jeffrey M.D. CC: Edmund Jeffrey Technologist: Matilde Moore 
RT(R)(M); Meg Hollins RT(N)  Transcrpt Date/Tm/Trnsp: 2021 () 
hCi Orig Print D/T: S: 2021 () [facility] 
NAME: SALEEM MOORE [address] [facility] [address]: [place] [address] : 1960 AGE: 60 SEX: M  ACCT 
NO: [id number] LOC: J CARLOS PHONE #: [phone] EXAM DATE: 2021 STATUS:
 LEISA VERDUZCO FAX #: [phone] RADIOLOGY NO: PAGE 1  Signed ReportPOCT GLUCOSE 
(AUTOMATED)2020 16:34:00





             Test Item    Value        Reference Range Interpretation Comments

 

             POCT GLU (test code = 2287728496) >600                HH     

      

 

             Lab Interpretation (test code = Abnormal                           

    



             52814-2)                                            



[facility] GLUCOSE (AUTOMATED)2020 16:34:00





             Test Item    Value        Reference Range Interpretation Comments

 

             POCT GLU (test code = 1356916026) >600                HH     

      

 

             Lab Interpretation (test code = Abnormal                           

    



             32269-4)                                            



[facility] GLUCOSE (AUTOMATED)2020 13:51:00





             Test Item    Value        Reference Range Interpretation Comments

 

             POCT GLU (test code = 5632731149) 266 mg/dL           H      

      

 

             Lab Interpretation (test code = Abnormal                           

    



             47217-3)                                            



[facility] Metabolic Panel (NA, K, CL, CO2, 
GLUCOSE, BUN, CREATININE, CA)2020 11:45:00





             Test Item    Value        Reference Range Interpretation Comments

 

             NA (test code = 137 mmol/L   135-145                   



             4354586116)                                         

 

             K (test code = 4.3 mmol/L   3.5-5                     



             1509596883)                                         

 

             CL (test code = 105 mmol/L                       



             5835302011)                                         

 

             CO2 TOTAL (test code = 26 mmol/L    23-31                     



             0739237573)                                         

 

             AGAP (test code =              2-16                      



             4516663680)                                         

 

             BUN (test code = 29 mg/dL     7-23         H            



             0670733958)                                         

 

             GLUCOSE (test code = 270 mg/dL           H            



             2927166954)                                         

 

             CREATININE (test code = 1.13 mg/dL   0.6-1.25                  



             9986220722)                                         

 

             CALCIUM (test code = 9.1 mg/dL    8.6-10.6                  



             8741469327)                                         

 

             eGFR Calculation              mL/min/1.73m2              



             (Non-)                                        



             (test code =                                        



             2193124034)                                         

 

             eGFR Calculation              mL/min/1.73m2              



             (African American)                                        



             (test code =                                        



             6599116718)                                         

 

             MARGY (test code = MARGY) Association of                           



                          Glomerular Filtration                           



                          Rate (GFR) and Staging                           



                          of Kidney Disease*                           



                          +---------------------                           



                          --+-------------------                           



                          --+-------------------                           



                          ------+| GFR                           



                          (mL/min/1.73 m2) ?|                           



                          With Kidney Damage ?|                           



                          ?Without Kidney                           



                          Damage+---------------                           



                          --------+-------------                           



                          --------+-------------                           



                          ------------+| ?>90 ?                           



                          ? ? ? ? ? ? ? ?|                           



                          ?Stage one ? ? ? ? ?|                           



                          ? Normal ? ? ? ? ? ? ?                           



                          ?+--------------------                           



                          ---+------------------                           



                          ---+------------------                           



                          -------+| ?60-89 ? ? ?                           



                          ? ? ? ? ?| ?Stage two                           



                          ? ? ? ? ?| ? Decreased                           



                          GFR ? ? ? ?                            



                          +---------------------                           



                          --+-------------------                           



                          --+-------------------                           



                          ------+| ?30-59 ? ? ?                           



                          ? ? ? ? ?| ?Stage                           



                          three ? ? ? ?| ? Stage                           



                          three ? ? ? ? ?                           



                          +---------------------                           



                          --+-------------------                           



                          --+-------------------                           



                          ------+| ?15-29 ? ? ?                           



                          ? ? ? ? ?| ?Stage four                           



                          ? ? ? ? | ? Stage four                           



                          ? ? ? ? ?                              



                          ?+--------------------                           



                          ---+------------------                           



                          ---+------------------                           



                          -------+| ?<15 (or                           



                          dialysis) ? ?| ?Stage                           



                          five ? ? ? ? | ? Stage                           



                          five ? ? ? ? ?                           



                          ?+--------------------                           



                          ---+------------------                           



                          ---+------------------                           



                          -------+ *Each stage                           



                          assumes the associated                           



                          GFR level has been in                           



                          effect for at least                           



                          three months. ?Stages                           



                          1 to 5, with or                           



                          without kidney                           



                          disease, indicate                           



                          chronic kidney                           



                          disease. Notes:                           



                          Determination of                           



                          stages one and two                           



                          (with eGFR                             



                          >59mL/min/1.73 m2)                           



                          requires estimation of                           



                          kidney damage for at                           



                          least three months as                           



                          defined by structural                           



                          or functional                           



                          abnormalities of the                           



                          kidney, manifested by                           



                          either:Pathological                           



                          abnormalities or                           



                          Markers of kidney                           



                          damage (including                           



                          abnormalities in the                           



                          composition of the                           



                          blood or urine or                           



                          abnormalities in                           



                          imaging tests).                           

 

             Lab Interpretation Abnormal                               



             (test code = 06793-7)                                        



[facility] GLUCOSE (AUTOMATED)2020 01:40:00





             Test Item    Value        Reference Range Interpretation Comments

 

             POCT GLU (test code = 4337071327) 271 mg/dL           H      

      

 

             Lab Interpretation (test code = Abnormal                           

    



             29955-9)                                            



[facility] GLUCOSE (AUTOMATED)2020 21:55:00





             Test Item    Value        Reference Range Interpretation Comments

 

             POCT GLU (test code = 9791547241) 211 mg/dL           H      

      

 

             Lab Interpretation (test code = Abnormal                           

    



             40881-5)                                            



[facility] GLUCOSE (AUTOMATED)2020 17:43:00





             Test Item    Value        Reference Range Interpretation Comments

 

             POCT GLU (test code = 5588893584) 324 mg/dL           H      

      

 

             Lab Interpretation (test code = Abnormal                           

    



             48274-3)                                            



[facility] GLUCOSE (AUTOMATED)2020 14:50:00





             Test Item    Value        Reference Range Interpretation Comments

 

             POCT GLU (test code = 6272444492) 362 mg/dL           H      

      

 

             Lab Interpretation (test code = Abnormal                           

    



             97402-6)                                            



[facility] GLUCOSE (AUTOMATED)2020 13:55:00





             Test Item    Value        Reference Range Interpretation Comments

 

             POCT GLU (test code = 1820587760) 276 mg/dL           H      

      

 

             Lab Interpretation (test code = Abnormal                           

    



             87558-7)                                            



[facility]





             Test Item    Value        Reference Range Interpretation Comments

 

             TROPONIN I (test 0.007 ng/mL  See_Comment                [Automated



             code = [id number])                                        message] 

The



                                                                 system which



                                                                 generated this



                                                                 result



                                                                 transmitted



                                                                 reference range

:



                                                                 <=0.034. The



                                                                 reference range



                                                                 was not used to



                                                                 interpret this



                                                                 result as



                                                                 normal/abnormal

.

 

             MARGY (test code = Equal or Less than                           



             MARGY)         0.034                                  



                          ng/ml---Normal                           



                          ?Note: Cardiac                           



                          troponin begins to                           



                          rise 3-4 hours                           



                          after the onset of                           



                          ischemia. Repeat                           



                          in 4-6 hours if                           



                          the sample was                           



                          drawn within 3-4                           



                          hours of the onset                           



                          of the symptom and                           



                          found normal.                           



                          Between 0.035 and                           



                          0.120 ng/mL---                           



                          Borderline.                            



                          Questionable                           



                          myocardial injury                           



                          or necrosis ?                           



                          ?Note: Serial                           



                          measurement may be                           



                          necessary to                           



                          confirm or exclude                           



                          the diagnosis of                           



                          myocardial injury                           



                          or necrosis;                           



                          Clinical                               



                          correlation                            



                          (symptoms, EKGs,                           



                          imaging studies,                           



                          and others)                            



                          required; Repeat                           



                          in 4-6 hours if                           



                          clinically                             



                          indicated. ? ? ? ?                           



                          Equal or Higher                           



                          than 0.121                             



                          ng/mL---Abnormal.                           



                          Myocardial Injury                           



                          or Necrosis Likely                           



                          ? ? ? ? Biotin has                           



                          been reported to                           



                          cause a negative                           



                          bias, interpret                           



                          results relative                           



                          to patient's use                           



                          of biotin. ? ? ? ?                           



                          ? ? ? ? ? ? ? ? ?                           



                          ? ? ? ? ? ? ? ? ?                           



                          ? ? ? ? ? ? ? ? ?                           



                          ? ? ? ? ? ? ? ? ?                           



                          ? ? ? ? ? ? ? ? ?                           



                          ?                                      

 

             Lab Interpretation Normal                                 



             (test code =                                        



             33991-7)                                            



[facility] GLUCOSE (AUTOMATED)2020 11:11:00





             Test Item    Value        Reference Range Interpretation Comments

 

             POCT GLU (test code = 9915869523) 184 mg/dL           H      

      

 

             Lab Interpretation (test code = Abnormal                           

    



             11220-2)                                            



[id number]





             Test Item    Value        Reference Range Interpretation Comments

 

             MAGNESIUM (test code = 0104444866) 1.6 mg/dL    1.7-2.4      L     

       

 

             Lab Interpretation (test code = Abnormal                           

    



             63430-2)                                            



[facility], K, CL, CO2, 
GLUCOSE, BUN, CREATININE, CA)2020 11:07:00





             Test Item    Value        Reference Range Interpretation Comments

 

             NA (test code = 136 mmol/L   135-145                   



             2216612143)                                         

 

             K (test code = 4.1 mmol/L   3.5-5                     



             6295092918)                                         

 

             CL (test code = 102 mmol/L                       



             5244510875)                                         

 

             CO2 TOTAL (test code = 25 mmol/L    23-31                     



             2183094408)                                         

 

             AGAP (test code =              2-16                      



             4164057288)                                         

 

             BUN (test code = 32 mg/dL     7-23         H            



             9421900154)                                         

 

             GLUCOSE (test code = 162 mg/dL           H            



             5711288480)                                         

 

             CREATININE (test code = 1.34 mg/dL   0.6-1.25     H            



             9551660221)                                         

 

             CALCIUM (test code = 9.0 mg/dL    8.6-10.6                  



             5401059861)                                         

 

             eGFR Calculation              mL/min/1.73m2              



             (Non-)                                        



             (test code =                                        



             4582758896)                                         

 

             eGFR Calculation              mL/min/1.73m2              



             (African American)                                        



             (test code =                                        



             3656132551)                                         

 

             MARGY (test code = MARGY) Association of                           



                          Glomerular Filtration                           



                          Rate (GFR) and Staging                           



                          of Kidney Disease*                           



                          +---------------------                           



                          --+-------------------                           



                          --+-------------------                           



                          ------+| GFR                           



                          (mL/min/1.73 m2) ?|                           



                          With Kidney Damage ?|                           



                          ?Without Kidney                           



                          Damage+---------------                           



                          --------+-------------                           



                          --------+-------------                           



                          ------------+| ?>90 ?                           



                          ? ? ? ? ? ? ? ?|                           



                          ?Stage one ? ? ? ? ?|                           



                          ? Normal ? ? ? ? ? ? ?                           



                          ?+--------------------                           



                          ---+------------------                           



                          ---+------------------                           



                          -------+| ?60-89 ? ? ?                           



                          ? ? ? ? ?| ?Stage two                           



                          ? ? ? ? ?| ? Decreased                           



                          GFR ? ? ? ?                            



                          +---------------------                           



                          --+-------------------                           



                          --+-------------------                           



                          ------+| ?30-59 ? ? ?                           



                          ? ? ? ? ?| ?Stage                           



                          three ? ? ? ?| ? Stage                           



                          three ? ? ? ? ?                           



                          +---------------------                           



                          --+-------------------                           



                          --+-------------------                           



                          ------+| ?15-29 ? ? ?                           



                          ? ? ? ? ?| ?Stage four                           



                          ? ? ? ? | ? Stage four                           



                          ? ? ? ? ?                              



                          ?+--------------------                           



                          ---+------------------                           



                          ---+------------------                           



                          -------+| ?<15 (or                           



                          dialysis) ? ?| ?Stage                           



                          five ? ? ? ? | ? Stage                           



                          five ? ? ? ? ?                           



                          ?+--------------------                           



                          ---+------------------                           



                          ---+------------------                           



                          -------+ *Each stage                           



                          assumes the associated                           



                          GFR level has been in                           



                          effect for at least                           



                          three months. ?Stages                           



                          1 to 5, with or                           



                          without kidney                           



                          disease, indicate                           



                          chronic kidney                           



                          disease. Notes:                           



                          Determination of                           



                          stages one and two                           



                          (with eGFR                             



                          >59mL/min/1.73 m2)                           



                          requires estimation of                           



                          kidney damage for at                           



                          least three months as                           



                          defined by structural                           



                          or functional                           



                          abnormalities of the                           



                          kidney, manifested by                           



                          either:Pathological                           



                          abnormalities or                           



                          Markers of kidney                           



                          damage (including                           



                          abnormalities in the                           



                          composition of the                           



                          blood or urine or                           



                          abnormalities in                           



                          imaging tests).                           

 

             Lab Interpretation Abnormal                               



             (test code = 19923-5)                                        



[facility] WITH [id number] 11:07:00





             Test Item    Value        Reference Range Interpretation Comments

 

             WBC (test code =              See_Comment                [Automated



             1699-2)                                             message] The sy

stem



                                                                 which generated



                                                                 this result



                                                                 transmitted



                                                                 reference range

:



                                                                 4.20 - 10.70



                                                                 10*3/?L. The



                                                                 reference range

 was



                                                                 not used to



                                                                 interpret this



                                                                 result as



                                                                 normal/abnormal

.

 

             RBC (test code =              See_Comment  L             [Automated



             809-8)                                              message] The sy

stem



                                                                 which generated



                                                                 this result



                                                                 transmitted



                                                                 reference range

:



                                                                 4.26 - 5.52



                                                                 10*6/?L. The



                                                                 reference range

 was



                                                                 not used to



                                                                 interpret this



                                                                 result as



                                                                 normal/abnormal

.

 

             HGB (test code = 12.4 g/dL    12.2-16.4                 



             718-7)                                              

 

             HCT (test code = 37.1 %       38.4-49.3    L            



             4544-3)                                             

 

             MCV (test code = 87.3 fL      81.7-95.6                 



             787-2)                                              

 

             MCH (test code = 29.2 pg      26.1-32.7                 



             785-6)                                              

 

             MCHC (test code = 33.4 g/dL    31.2-35                   



             786-4)                                              

 

             RDW-SD (test code = 41.5 fL      38.5-51.6                 



             80761-5)                                            

 

             RDW-CV (test code = 13.2 %       12.1-15.4                 



             788-0)                                              

 

             PLT (test code =              See_Comment                [Automated



             777-3)                                              message] The sy

stem



                                                                 which generated



                                                                 this result



                                                                 transmitted



                                                                 reference range

:



                                                                 150 - 328 10*3/

?L.



                                                                 The reference r

ahsan



                                                                 was not used to



                                                                 interpret this



                                                                 result as



                                                                 normal/abnormal

.

 

             MPV (test code = 11.3 fL      9.8-13                    



             16450-0)                                            

 

             NRBC/100 WBC (test              See_Comment                [Automat

ed



             code = 0520261386)                                        message] 

The system



                                                                 which generated



                                                                 this result



                                                                 transmitted



                                                                 reference range

:



                                                                 0.0 - 10.0 /100



                                                                 WBCs. The refer

ence



                                                                 range was not u

sed



                                                                 to interpret th

is



                                                                 result as



                                                                 normal/abnormal

.

 

             NRBC x10^3 (test code <0.01        See_Comment                [Auto

mated



             = [id number])                                        message] The s

ystem



                                                                 which generated



                                                                 this result



                                                                 transmitted



                                                                 reference range

:



                                                                 10*3/?L. The



                                                                 reference range

 was



                                                                 not used to



                                                                 interpret this



                                                                 result as



                                                                 normal/abnormal

.

 

             GRAN MAT (NEUT) % 56.6 %                                 



             (test code = 770-8)                                        

 

             IMM GRAN % (test code 0.00 %                                 



             = 1799790405)                                        

 

             LYMPH % (test code = 29.4 %                                 



             736-9)                                              

 

             MONO % (test code = 7.8 %                                  



             5905-5)                                             

 

             EOS % (test code = 5.2 %                                  



             713-8)                                              

 

             BASO % (test code = 1.0 %                                  



             706-2)                                              

 

             GRAN MAT x10^3(ANC) 3.48 10*3/uL 1.99-6.95                 



             (test code =                                        



             5861539328)                                         

 

             IMM GRAN x10^3 (test <0.03        0-0.06                    



             code = 8372077990)                                        

 

             LYMPH x10^3 (test code 1.81 10*3/uL 1.09-3.23                 



             = 731-0)                                            

 

             MONO x10^3 (test code 0.48 10*3/uL 0.36-1.02                 



             = 742-7)                                            

 

             EOS x10^3 (test code = 0.32 10*3/uL 0.06-0.53                 



             711-2)                                              

 

             BASO x10^3 (test code 0.06 10*3/uL 0.01-0.09                 



             = 704-7)                                            

 

             Lab Interpretation Abnormal                               



             (test code = 79927-6)                                        



[facility]





             Test Item    Value        Reference Range Interpretation Comments

 

             POCT GLU (test code = 7518510704) 105 mg/dL                  

      

 

             Lab Interpretation (test code = Normal                             

    



             74486-6)                                            



[facility]





             Test Item    Value        Reference Range Interpretation Comments

 

             TROPONIN I (test 0.009 ng/mL  See_Comment                [Automated



             code = 0469635685)                                        message] 

The



                                                                 system which



                                                                 generated this



                                                                 result



                                                                 transmitted



                                                                 reference range

:



                                                                 <=0.034. The



                                                                 reference range



                                                                 was not used to



                                                                 interpret this



                                                                 result as



                                                                 normal/abnormal

.

 

             MARGY (test code = Equal or Less than                           



             MARGY)         0.034                                  



                          ng/ml---Normal                           



                          ?Note: Cardiac                           



                          troponin begins to                           



                          rise 3-4 hours                           



                          after the onset of                           



                          ischemia. Repeat                           



                          in 4-6 hours if                           



                          the sample was                           



                          drawn within 3-4                           



                          hours of the onset                           



                          of the symptom and                           



                          found normal.                           



                          Between 0.035 and                           



                          0.120 ng/mL---                           



                          Borderline.                            



                          Questionable                           



                          myocardial injury                           



                          or necrosis ?                           



                          ?Note: Serial                           



                          measurement may be                           



                          necessary to                           



                          confirm or exclude                           



                          the diagnosis of                           



                          myocardial injury                           



                          or necrosis;                           



                          Clinical                               



                          correlation                            



                          (symptoms, EKGs,                           



                          imaging studies,                           



                          and others)                            



                          required; Repeat                           



                          in 4-6 hours if                           



                          clinically                             



                          indicated. ? ? ? ?                           



                          Equal or Higher                           



                          than 0.121                             



                          ng/mL---Abnormal.                           



                          Myocardial Injury                           



                          or Necrosis Likely                           



                          ? ? ? ? Biotin has                           



                          been reported to                           



                          cause a negative                           



                          bias, interpret                           



                          results relative                           



                          to patient's use                           



                          of biotin. ? ? ? ?                           



                          ? ? ? ? ? ? ? ? ?                           



                          ? ? ? ? ? ? ? ? ?                           



                          ? ? ? ? ? ? ? ? ?                           



                          ? ? ? ? ? ? ? ? ?                           



                          ? ? ? ? ? ? ? ? ?                           



                          ?                                      

 

             Lab Interpretation Normal                                 



             (test code =                                        



             55454-9)                                            



[facility] K, CL, CO2, 
GLUCOSE, BUN, CREATININE, CA)2020 04:07:00





             Test Item    Value        Reference Range Interpretation Comments

 

             NA (test code = 134 mmol/L   135-145      L            



             [id number])                                         

 

             K (test code = 4.3 mmol/L   3.5-5                     



             2509529055)                                         

 

             CL (test code = 101 mmol/L                       



             7348239336)                                         

 

             CO2 TOTAL (test code = 26 mmol/L    23-31                     



             1586720153)                                         

 

             AGAP (test code =              2-16                      



             4279783841)                                         

 

             BUN (test code = 39 mg/dL     7-23         H            



             7952104486)                                         

 

             GLUCOSE (test code = 242 mg/dL           H            



             8798570187)                                         

 

             CREATININE (test code = 1.57 mg/dL   0.6-1.25     H            



             2281959270)                                         

 

             CALCIUM (test code = 9.3 mg/dL    8.6-10.6                  



             6863450904)                                         

 

             eGFR Calculation              mL/min/1.73m2              



             (Non-)                                        



             (test code =                                        



             1647419342)                                         

 

             eGFR Calculation              mL/min/1.73m2              



             (African American)                                        



             (test code =                                        



             8415104806)                                         

 

             MARGY (test code = MARGY) Association of                           



                          Glomerular Filtration                           



                          Rate (GFR) and Staging                           



                          of Kidney Disease*                           



                          +---------------------                           



                          --+-------------------                           



                          --+-------------------                           



                          ------+| GFR                           



                          (mL/min/1.73 m2) ?|                           



                          With Kidney Damage ?|                           



                          ?Without Kidney                           



                          Damage+---------------                           



                          --------+-------------                           



                          --------+-------------                           



                          ------------+| ?>90 ?                           



                          ? ? ? ? ? ? ? ?|                           



                          ?Stage one ? ? ? ? ?|                           



                          ? Normal ? ? ? ? ? ? ?                           



                          ?+--------------------                           



                          ---+------------------                           



                          ---+------------------                           



                          -------+| ?60-89 ? ? ?                           



                          ? ? ? ? ?| ?Stage two                           



                          ? ? ? ? ?| ? Decreased                           



                          GFR ? ? ? ?                            



                          +---------------------                           



                          --+-------------------                           



                          --+-------------------                           



                          ------+| ?30-59 ? ? ?                           



                          ? ? ? ? ?| ?Stage                           



                          three ? ? ? ?| ? Stage                           



                          three ? ? ? ? ?                           



                          +---------------------                           



                          --+-------------------                           



                          --+-------------------                           



                          ------+| ?15-29 ? ? ?                           



                          ? ? ? ? ?| ?Stage four                           



                          ? ? ? ? | ? Stage four                           



                          ? ? ? ? ?                              



                          ?+--------------------                           



                          ---+------------------                           



                          ---+------------------                           



                          -------+| ?<15 (or                           



                          dialysis) ? ?| ?Stage                           



                          five ? ? ? ? | ? Stage                           



                          five ? ? ? ? ?                           



                          ?+--------------------                           



                          ---+------------------                           



                          ---+------------------                           



                          -------+ *Each stage                           



                          assumes the associated                           



                          GFR level has been in                           



                          effect for at least                           



                          three months. ?Stages                           



                          1 to 5, with or                           



                          without kidney                           



                          disease, indicate                           



                          chronic kidney                           



                          disease. Notes:                           



                          Determination of                           



                          stages one and two                           



                          (with eGFR                             



                          >59mL/min/1.73 m2)                           



                          requires estimation of                           



                          kidney damage for at                           



                          least three months as                           



                          defined by structural                           



                          or functional                           



                          abnormalities of the                           



                          kidney, manifested by                           



                          either:Pathological                           



                          abnormalities or                           



                          Markers of kidney                           



                          damage (including                           



                          abnormalities in the                           



                          composition of the                           



                          blood or urine or                           



                          abnormalities in                           



                          imaging tests).                           

 

             Lab Interpretation Abnormal                               



             (test code = 23615-8)                                        



[facility] [id number] 04:04:48 No acute 
intrathoracic abnormality.PROCEDURE: XR CHEST 1 VW CLINICAL INDICATION: chest 
pain COMPARISON: 2019 FINDINGS: The lungs are partial expanded and clear. 
Perihilar vessels are mildlyprominent. No pleural effusion or pneumothorax is 
seen. The cardiomediastinal silhouette is normal. No acutebony abnormality. 
[facility], [facility] Results In[employer] User - 2020 10:05 PM CSTPROCEDURE: XR CHEST 1 
VWCLINICAL INDICATION: chest pain COMPARISON: 2019FINDINGS:The lungs are 
partial expanded and clear.Perihilar vessels are mildlyprominent. No pleural 
effusion or pneumothorax is seen. The cardiomediastinal silhouette is normal. No
acute bony abnormality.IMPRESSIONNo acute intrathoracic abnormality.[facility] METABOLIC PANEL (NA, K, CL, CO2, GLUCOSE, BUN, 
CREATININE, CA)2020 02:02:00





             Test Item    Value        Reference Range Interpretation Comments

 

             NA (test code = 132 mmol/L   135-145      L            



             5660566915)                                         

 

             K (test code = 4.7 mmol/L   3.5-5                     



             8204882089)                                         

 

             CL (test code = 98 mmol/L                        



             1456995212)                                         

 

             CO2 TOTAL (test code = 25 mmol/L    23-31                     



             3925699592)                                         

 

             AGAP (test code =              2-16                      



             2991121245)                                         

 

             BUN (test code = 40 mg/dL     7-23         H            



             1499438850)                                         

 

             GLUCOSE (test code = 448 mg/dL           H            



             9864826403)                                         

 

             CREATININE (test code = 1.83 mg/dL   0.6-1.25     H            



             5365332599)                                         

 

             CALCIUM (test code = 9.1 mg/dL    8.6-10.6                  



             8780666544)                                         

 

             eGFR Calculation              mL/min/1.73m2              



             (Non-)                                        



             (test code =                                        



             5433323237)                                         

 

             eGFR Calculation              mL/min/1.73m2              



             (African American)                                        



             (test code =                                        



             5013797670)                                         

 

             MARGY (test code = MARGY) Association of                           



                          Glomerular Filtration                           



                          Rate (GFR) and Staging                           



                          of Kidney Disease*                           



                          +---------------------                           



                          --+-------------------                           



                          --+-------------------                           



                          ------+| GFR                           



                          (mL/min/1.73 m2) ?|                           



                          With Kidney Damage ?|                           



                          ?Without Kidney                           



                          Damage+---------------                           



                          --------+-------------                           



                          --------+-------------                           



                          ------------+| ?>90 ?                           



                          ? ? ? ? ? ? ? ?|                           



                          ?Stage one ? ? ? ? ?|                           



                          ? Normal ? ? ? ? ? ? ?                           



                          ?+--------------------                           



                          ---+------------------                           



                          ---+------------------                           



                          -------+| ?60-89 ? ? ?                           



                          ? ? ? ? ?| ?Stage two                           



                          ? ? ? ? ?| ? Decreased                           



                          GFR ? ? ? ?                            



                          +---------------------                           



                          --+-------------------                           



                          --+-------------------                           



                          ------+| ?30-59 ? ? ?                           



                          ? ? ? ? ?| ?Stage                           



                          three ? ? ? ?| ? Stage                           



                          three ? ? ? ? ?                           



                          +---------------------                           



                          --+-------------------                           



                          --+-------------------                           



                          ------+| ?15-29 ? ? ?                           



                          ? ? ? ? ?| ?Stage four                           



                          ? ? ? ? | ? Stage four                           



                          ? ? ? ? ?                              



                          ?+--------------------                           



                          ---+------------------                           



                          ---+------------------                           



                          -------+| ?<15 (or                           



                          dialysis) ? ?| ?Stage                           



                          five ? ? ? ? | ? Stage                           



                          five ? ? ? ? ?                           



                          ?+--------------------                           



                          ---+------------------                           



                          ---+------------------                           



                          -------+ *Each stage                           



                          assumes the associated                           



                          GFR level has been in                           



                          effect for at least                           



                          three months. ?Stages                           



                          1 to 5, with or                           



                          without kidney                           



                          disease, indicate                           



                          chronic kidney                           



                          disease. Notes:                           



                          Determination of                           



                          stages one and two                           



                          (with eGFR                             



                          >59mL/min/1.73 m2)                           



                          requires estimation of                           



                          kidney damage for at                           



                          least three months as                           



                          defined by structural                           



                          or functional                           



                          abnormalities of the                           



                          kidney, manifested by                           



                          either:Pathological                           



                          abnormalities or                           



                          Markers of kidney                           



                          damage (including                           



                          abnormalities in the                           



                          composition of the                           



                          blood or urine or                           



                          abnormalities in                           



                          imaging tests).                           

 

             Lab Interpretation Abnormal                               



             (test code = 55279-8)                                        



[facility] 
01:18:00





             Test Item    Value        Reference    Interpretation Comments



                                       Range                     

 

             HGB A1C (test code =              See_Comment  H             [Autom

ated



             4548-4)                                             message] The



                                                                 system which



                                                                 generated this



                                                                 result



                                                                 transmitted



                                                                 reference range

:



                                                                 4.0 - 6.0 %



                                                                 NGSP. The



                                                                 reference range



                                                                 was not used to



                                                                 interpret this



                                                                 result as



                                                                 normal/abnormal

.

 

             MARGY (test code = %A1C (NGSP)                            



             MARGY)         Interpretation                           



                          (ADA)4.8-5.6 ? ?                           



                          Normal or                              



                          (Non-Diabetic                           



                          Range)5.7-6.4 ? ?                           



                          Increased Risk                           



                          (Pre-Diabetic)>6.5 ?                           



                          ? ? ?Diabetes                           



                          Indicated                              

 

             Lab Interpretation Abnormal                               



             (test code =                                        



             86375-7)                                            



[facility] GLUCOSE (AUTOMATED)2020 23:41:00





             Test Item    Value        Reference Range Interpretation Comments

 

             POCT GLU (test code = 9698661890) 504 mg/dL           HH     

      

 

             Lab Interpretation (test code = Abnormal                           

    



             62260-1)                                            



[facility] GLUCOSE (AUTOMATED)2020 22:13:00





             Test Item    Value        Reference Range Interpretation Comments

 

             POCT GLU (test code = 9974112735) 495 mg/dL           HH     

      

 

             Lab Interpretation (test code = Abnormal                           

    



             93292-0)                                            



[facility]





             Test Item    Value        Reference Range Interpretation Comments

 

             TROPONIN I (test 0.007 ng/mL  See_Comment                [Automated



             code = 3259265107)                                        message] 

The



                                                                 system which



                                                                 generated this



                                                                 result



                                                                 transmitted



                                                                 reference range

:



                                                                 <=0.034. The



                                                                 reference range



                                                                 was not used to



                                                                 interpret this



                                                                 result as



                                                                 normal/abnormal

.

 

             MARGY (test code = Equal or Less than                           



             MARGY)         0.034                                  



                          ng/ml---Normal                           



                          ?Note: Cardiac                           



                          troponin begins to                           



                          rise 3-4 hours                           



                          after the onset of                           



                          ischemia. Repeat                           



                          in 4-6 hours if                           



                          the sample was                           



                          drawn within 3-4                           



                          hours of the onset                           



                          of the symptom and                           



                          found normal.                           



                          Between 0.035 and                           



                          0.120 ng/mL---                           



                          Borderline.                            



                          Questionable                           



                          myocardial injury                           



                          or necrosis ?                           



                          ?Note: Serial                           



                          measurement may be                           



                          necessary to                           



                          confirm or exclude                           



                          the diagnosis of                           



                          myocardial injury                           



                          or necrosis;                           



                          Clinical                               



                          correlation                            



                          (symptoms, EKGs,                           



                          imaging studies,                           



                          and others)                            



                          required; Repeat                           



                          in 4-6 hours if                           



                          clinically                             



                          indicated. ? ? ? ?                           



                          Equal or Higher                           



                          than 0.121                             



                          ng/mL---Abnormal.                           



                          Myocardial Injury                           



                          or Necrosis Likely                           



                          ? ? ? ? Biotin has                           



                          been reported to                           



                          cause a negative                           



                          bias, interpret                           



                          results relative                           



                          to patient's use                           



                          of biotin. ? ? ? ?                           



                          ? ? ? ? ? ? ? ? ?                           



                          ? ? ? ? ? ? ? ? ?                           



                          ? ? ? ? ? ? ? ? ?                           



                          ? ? ? ? ? ? ? ? ?                           



                          ? ? ? ? ? ? ? ? ?                           



                          ?                                      

 

             Lab Interpretation Normal                                 



             (test code =                                        



             88664-7)                                            



[facility]. METABOLIC PANEL ([id number] 
19:45:00





             Test Item    Value        Reference Range Interpretation Comments

 

             NA (test code = 127 mmol/L   135-145      L            



             2177343829)                                         

 

             K (test code = 5.0 mmol/L   3.5-5                     



             6605503556)                                         

 

             CL (test code = 93 mmol/L           L            



             6808407631)                                         

 

             CO2 TOTAL (test code = 24 mmol/L    23-31                     



             2218117378)                                         

 

             AGAP (test code =              2-16                      



             4109675594)                                         

 

             BUN (test code = 40 mg/dL     7-23         H            



             1567087477)                                         

 

             GLUCOSE (test code = 716 mg/dL           HH           



             5798376987)                                         

 

             CREATININE (test code = 2.14 mg/dL   0.6-1.25     H            



             9984665474)                                         

 

             TOTAL BILI (test code = 0.4 mg/dL    0.1-1.1                   



             4271008773)                                         

 

             CALCIUM (test code = 9.1 mg/dL    8.6-10.6                  



             8971228024)                                         

 

             T PROTEIN (test code = 6.9 g/dL     6.3-8.2                   



             7799254578)                                         

 

             ALBUMIN (test code = 4.3 g/dL     3.5-5                     



             9543829642)                                         

 

             ALK PHOS (test code = 106 U/L                          



             3238262110)                                         

 

             ALTv (test code = 16 U/L       5-50                      



             1742-6)                                             

 

             AST(SGOT) (test code = 19 U/L       13-40                     



             8022490259)                                         

 

             eGFR Calculation              mL/min/1.73m2              



             (Non-)                                        



             (test code =                                        



             2045022898)                                         

 

             eGFR Calculation              mL/min/1.73m2              



             (African American)                                        



             (test code =                                        



             5866478709)                                         

 

             MARGY (test code = MARGY) Association of                           



                          Glomerular Filtration                           



                          Rate (GFR) and Staging                           



                          of Kidney Disease*                           



                          +---------------------                           



                          --+-------------------                           



                          --+-------------------                           



                          ------+| GFR                           



                          (mL/min/1.73 m2) ?|                           



                          With Kidney Damage ?|                           



                          ?Without Kidney                           



                          Damage+---------------                           



                          --------+-------------                           



                          --------+-------------                           



                          ------------+| ?>90 ?                           



                          ? ? ? ? ? ? ? ?|                           



                          ?Stage one ? ? ? ? ?|                           



                          ? Normal ? ? ? ? ? ? ?                           



                          ?+--------------------                           



                          ---+------------------                           



                          ---+------------------                           



                          -------+| ?60-89 ? ? ?                           



                          ? ? ? ? ?| ?Stage two                           



                          ? ? ? ? ?| ? Decreased                           



                          GFR ? ? ? ?                            



                          +---------------------                           



                          --+-------------------                           



                          --+-------------------                           



                          ------+| ?30-59 ? ? ?                           



                          ? ? ? ? ?| ?Stage                           



                          three ? ? ? ?| ? Stage                           



                          three ? ? ? ? ?                           



                          +---------------------                           



                          --+-------------------                           



                          --+-------------------                           



                          ------+| ?15-29 ? ? ?                           



                          ? ? ? ? ?| ?Stage four                           



                          ? ? ? ? | ? Stage four                           



                          ? ? ? ? ?                              



                          ?+--------------------                           



                          ---+------------------                           



                          ---+------------------                           



                          -------+| ?<15 (or                           



                          dialysis) ? ?| ?Stage                           



                          five ? ? ? ? | ? Stage                           



                          five ? ? ? ? ?                           



                          ?+--------------------                           



                          ---+------------------                           



                          ---+------------------                           



                          -------+ *Each stage                           



                          assumes the associated                           



                          GFR level has been in                           



                          effect for at least                           



                          three months. ?Stages                           



                          1 to 5, with or                           



                          without kidney                           



                          disease, indicate                           



                          chronic kidney                           



                          disease. Notes:                           



                          Determination of                           



                          stages one and two                           



                          (with eGFR                             



                          >59mL/min/1.73 m2)                           



                          requires estimation of                           



                          kidney damage for at                           



                          least three months as                           



                          defined by structural                           



                          or functional                           



                          abnormalities of the                           



                          kidney, manifested by                           



                          either:Pathological                           



                          abnormalities or                           



                          Markers of kidney                           



                          damage (including                           



                          abnormalities in the                           



                          composition of the                           



                          blood or urine or                           



                          abnormalities in                           



                          imaging tests).                           

 

             Lab Interpretation Abnormal                               



             (test code = 34214-9)                                        



[facility] [id number] 19:30:00





             Test Item    Value        Reference Range Interpretation Comments

 

             APPEARANCE (test code = Clear        Clear                     



             3870083065)                                         

 

             COLOR (test code = Yellow       Yellow                    



             5017689322)                                         

 

             PH (test code =              4.8-8.0                   



             2429522391)                                         

 

             SP GRAVITY (test code =              1.003-1.030               



             2308894446)                                         

 

             GLU U QUAL (test code = 500 mg/dL    Normal       A            



             5647501847)                                         

 

             BLOOD (test code = Negative     Negative                  



             4364780718)                                         

 

             KETONES (test code = Negative     Negative                  



             3794001386)                                         

 

             PROTEIN (test code = Negative     Negative                  



             2887-8)                                             

 

             UROBILIN (test code = Normal       Normal                    



             3428388736)                                         

 

             BILIRUBIN (test code = Negative     Negative                  



             9307783989)                                         

 

             NITRITE (test code = Negative     Negative                  



             8731863204)                                         

 

             LEUK AMAYA (test code = Negative     Negative                  



             1109100652)                                         

 

             RBC/HPF (test code =              See_Comment                [Autom

ated message]



             2452237657)                                         The system "[employer]



                                                                 generated this



                                                                 result transmit

johanna



                                                                 reference range

: 0 -



                                                                 3 HPF. The refe

rence



                                                                 range was not u

sed



                                                                 to interpret th

is



                                                                 result as



                                                                 normal/abnormal

.

 

             WBC/HPF (test code =              See_Comment                [Autom

ated message]



             5211029822)                                         The system "[employer]



                                                                 generated this



                                                                 result transmit

johanna



                                                                 reference range

: 0 -



                                                                 5 HPF. The refe

rence



                                                                 range was not u

sed



                                                                 to interpret th

is



                                                                 result as



                                                                 normal/abnormal

.

 

             BACTERIA (test code = Negative     Negative                  



             9006009461)                                         

 

             MUCOUS (test code = Slight       Negative LPF A            



             7452827171)                                         

 

             SQ EPITH (test code = <1           HPF                       



             1817042950)                                         

 

             HYAL CAST (test code =              See_Comment  H             [Aut

omated message]



             1846902904)                                         The system "[employer]



                                                                 generated this



                                                                 result transmit

johanna



                                                                 reference range

: <=2



                                                                 LPF. The refere

nce



                                                                 range was not u

sed



                                                                 to interpret th

is



                                                                 result as



                                                                 normal/abnormal

.

 

             Lab Interpretation (test Abnormal                               



             code = 43593-0)                                        



[id number]





             Test Item    Value        Reference Range Interpretation Comments

 

             PH (test code =              7.32-7.42                 



             4297942390)                                         

 

             PCO2 MEGGAN (test code =              See_Comment  L             [Auto

mated message]



             7170294287)                                         The system "[employer]



                                                                 generated this 

result



                                                                 transmitted ref

erence



                                                                 range: 41 - 51 

mmHg.



                                                                 The reference r

ahsan



                                                                 was not used to



                                                                 interpret this 

result



                                                                 as normal/abnor

mal.

 

             PO2 MEGGAN (test code =              See_Comment  H             [Autom

ated message]



             1741889807)                                         The system [employer]

h



                                                                 generated this 

result



                                                                 transmitted ref

erence



                                                                 range: 25 - 40 

mmHg.



                                                                 The reference r

ahsan



                                                                 was not used to



                                                                 interpret this 

result



                                                                 as normal/abnor

mal.

 

             HCO3 MEGGAN (test code =              See_Comment  L             [Auto

mated message]



             9417815304)                                         The system [employer]

h



                                                                 generated this 

result



                                                                 transmitted ref

erence



                                                                 range: 24 - 28 

mEq/L.



                                                                 The reference r

ahsan



                                                                 was not used to



                                                                 interpret this 

result



                                                                 as normal/abnor

mal.

 

             AC VBE(BEAKER) (test              mEq/L                     



             code = 2900610949)                                        

 

             Lab Interpretation (test Abnormal                               



             code = 56683-9)                                        



[facility] WITH [id number] 19:20:00





             Test Item    Value        Reference Range Interpretation Comments

 

             WBC (test code =              See_Comment                [Automated



             6690-2)                                             message] The sy

stem



                                                                 which generated



                                                                 this result



                                                                 transmitted



                                                                 reference range

:



                                                                 4.20 - 10.70



                                                                 10*3/?L. The



                                                                 reference range

 was



                                                                 not used to



                                                                 interpret this



                                                                 result as



                                                                 normal/abnormal

.

 

             RBC (test code =              See_Comment                [Automated



             789-8)                                              message] The sy

stem



                                                                 which generated



                                                                 this result



                                                                 transmitted



                                                                 reference range

:



                                                                 4.26 - 5.52



                                                                 10*6/?L. The



                                                                 reference range

 was



                                                                 not used to



                                                                 interpret this



                                                                 result as



                                                                 normal/abnormal

.

 

             HGB (test code = 13.1 g/dL    12.2-16.4                 



             718-7)                                              

 

             HCT (test code = 38.1 %       38.4-49.3    L            



             4544-3)                                             

 

             MCV (test code = 86.2 fL      81.7-95.6                 



             787-2)                                              

 

             MCH (test code = 29.6 pg      26.1-32.7                 



             785-6)                                              

 

             MCHC (test code = 34.4 g/dL    31.2-35                   



             786-4)                                              

 

             RDW-SD (test code = 40.6 fL      38.5-51.6                 



             46590-3)                                            

 

             RDW-CV (test code = 13.2 %       12.1-15.4                 



             788-0)                                              

 

             PLT (test code =              See_Comment                [Automated



             777-3)                                              message] The sy

stem



                                                                 which generated



                                                                 this result



                                                                 transmitted



                                                                 reference range

:



                                                                 150 - 328 10*3/

?L.



                                                                 The reference r

ahsan



                                                                 was not used to



                                                                 interpret this



                                                                 result as



                                                                 normal/abnormal

.

 

             MPV (test code = 11.7 fL      9.8-13                    



             17257-8)                                            

 

             NRBC/100 WBC (test              See_Comment                [Automat

ed



             code = [id number])                                        message] 

The system



                                                                 which generated



                                                                 this result



                                                                 transmitted



                                                                 reference range

:



                                                                 0.0 - 10.0 /100



                                                                 WBCs. The refer

ence



                                                                 range was not u

sed



                                                                 to interpret th

is



                                                                 result as



                                                                 normal/abnormal

.

 

             NRBC x10^3 (test code <0.01        See_Comment                [Auto

mated



             = [id number])                                        message] The s

ystem



                                                                 which generated



                                                                 this result



                                                                 transmitted



                                                                 reference range

:



                                                                 10*3/?L. The



                                                                 reference range

 was



                                                                 not used to



                                                                 interpret this



                                                                 result as



                                                                 normal/abnormal

.

 

             GRAN MAT (NEUT) % 76.3 %                                 



             (test code = 770-8)                                        

 

             IMM GRAN % (test code 0.40 %                                 



             = [id number])                                        

 

             LYMPH % (test code = 13.7 %                                 



             736-9)                                              

 

             MONO % (test code = 6.3 %                                  



             5905-5)                                             

 

             EOS % (test code = 2.1 %                                  



             713-8)                                              

 

             BASO % (test code = 1.2 %                                  



             706-2)                                              

 

             GRAN MAT x10^3(ANC) 5.92 10*3/uL 1.99-6.95                 



             (test code =                                        



             [id number])                                         

 

             IMM GRAN x10^3 (test 0.03 10*3/uL 0-0.06                    



             code = [id number])                                        

 

             LYMPH x10^3 (test code 1.06 10*3/uL 1.09-3.23    L            



             = 731-0)                                            

 

             MONO x10^3 (test code 0.49 10*3/uL 0.36-1.02                 



             = 742-7)                                            

 

             EOS x10^3 (test code = 0.16 10*3/uL 0.06-0.53                 



             711-2)                                              

 

             BASO x10^3 (test code 0.09 10*3/uL 0.01-0.09                 



             = 704-7)                                            

 

             Lab Interpretation Abnormal                               



             (test code = 50531-2)                                        



[facility] GLUCOSE(AGE &gt;30DAYS)2020 
19:11:00





             Test Item    Value        Reference Range Interpretation Comments

 

             POCT Glu (age>30days) (test code = HI                        

       



             3342)                                               

 

             Lab Interpretation (test code = Normal                             

    



             61064-1)                                            



[id number]37[facility]17
HISTORY: Back pain. COMPARISON: Prior study of 10/7/2016. TECHNIQUE: AP x2, 
lateral, swimmer's x2 views are submitted of the thoracicspines. FINDINGS: No 
compression fracture or aggressive bone lesionsdetected.Osteophytes are seen 
along the ventral and lateral vertebral margins atlower thoracic levels. No 
paravertebral soft tissue swelling. Minimal midthoracic dextroscoliosis noted. 
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 
unchanged when compared with 10/6/2016 study. [facility], Radiant Results Inft User - 
2019 12:39 PM CDTHISTORY: Back pain.COMPARISON: Prior study of 
10/7/2016.TECHNIQUE: AP x2, lateral, swimmer's x2 views are submitted of the 
thoracicspines.FINDINGS: No compression fracture or aggressive bone lesions 
detected.Osteophytes are seen along the ventral and lateral vertebral margins 
atlower thoracic levels. No paravertebral soft tissue swelling. Minimal 
midthoracic dextroscoliosis noted.CONCLUSIONS: Mild degenerative spondylosis of 
lower thoracic spines,essentially unchanged when compared with 10/6/2016 study.
[facility] none